# Patient Record
Sex: MALE | Race: WHITE | NOT HISPANIC OR LATINO | Employment: FULL TIME | URBAN - METROPOLITAN AREA
[De-identification: names, ages, dates, MRNs, and addresses within clinical notes are randomized per-mention and may not be internally consistent; named-entity substitution may affect disease eponyms.]

---

## 2017-05-25 ENCOUNTER — TRANSCRIBE ORDERS (OUTPATIENT)
Dept: ADMINISTRATIVE | Facility: HOSPITAL | Age: 52
End: 2017-05-25

## 2017-05-25 ENCOUNTER — APPOINTMENT (OUTPATIENT)
Dept: LAB | Facility: HOSPITAL | Age: 52
End: 2017-05-25
Attending: INTERNAL MEDICINE
Payer: COMMERCIAL

## 2017-05-25 DIAGNOSIS — Z78.9 NORMAL URINALYSIS: ICD-10-CM

## 2017-05-25 DIAGNOSIS — R73.9 BLOOD GLUCOSE ELEVATED: Primary | ICD-10-CM

## 2017-05-25 LAB — VENIPUNCTURE: NORMAL

## 2017-05-25 PROCEDURE — 36415 COLL VENOUS BLD VENIPUNCTURE: CPT | Performed by: INTERNAL MEDICINE

## 2017-10-24 ENCOUNTER — ALLSCRIPTS OFFICE VISIT (OUTPATIENT)
Dept: OTHER | Facility: OTHER | Age: 52
End: 2017-10-24

## 2017-10-24 DIAGNOSIS — R06.09 OTHER FORMS OF DYSPNEA: ICD-10-CM

## 2017-10-26 NOTE — CONSULTS
Assessment  1  Chronic fatigue (780 79) (R53 82)  2  Abnormal circadian rhythm (327 30) (G47 20)  3  Dyspnea on exertion (786 09) (R06 09)    Plan  Abnormal circadian rhythm, Chronic fatigue, Snoring    · Sleep Study Unattended - Home; Status:Need Information - Financial Authorization; Requested ROSARIO:[id number];   Perform:Other; TDV:[id number]; Last Updated By:Shaw Currie; 10/24/2017   4:10:53 PM;Ordered; For:Abnormal circadian rhythm, Chronic fatigue, Snoring;   Ordered By:Ham Correia;  Chest pain    · Start: Aspirin 81 MG Oral Tablet Chewable; 2 tablet daily with food  Rx By: Erinn Conteh; Dispense: 30 Days ; #:30 Tablet Chewable; Refill: 6;For: Chest pain;   HAYLIE = N; Verified Transmission to [facility] (; Last Updated By:   System, SureScripts; 10/24/2017 3:55:20 PM   · Start: Nitroglycerin 0 4 MG Sublingual Tablet Sublingual; PLACE 1 TABLET UNDER THE  TONGUE EVERY 5 MINUTES UP TO 3 DOSES AS NEEDED FOR CHEST PAIN  Rx By: Erinn Conteh; Dispense: 30 Days ; #:30 Tablet Sublingual; Refill: 1;For: Chest   pain; HAYLIE = N; Verified Transmission to [facility] [phone]; Last   Updated By: System, SureScripts; 10/24/2017 4:00:41 PM   · EKG/ECG- POC; Status:Complete;   Done: [id number]  Perform: In Office; [phone]; Last Updated By:Nell Gerardo; 10/24/2017   3:21:24 PM;Ordered; For:Chest pain; Ordered By:Ham Correia;  Dyspnea on exertion    · ECHO COMPLETE WITH CONTRAST IF INDICATED; Status:Need Information - Financial  Authorization; Requested QPO:[id number];   Perform:[facility]; WLD:[id number]; Last Updated By:Shaw Currie;   10/24/2017 4:11:10 PM;Ordered; For:Dyspnea on exertion; Ordered By:Ham Correia;   · STRESS TEST ONLY, EXERCISE; Status:Active; Requested DRY:[id number];   Perform:[facility]; DWI:71HUR0624; Last Updated By:Shaw Currie;   10/24/2017 4:12:07 PM;Ordered;  For:Dyspnea on exertion; Ordered By:Masood, Ham;    Discussion/Summary    Chest pain- intermediate risk for coronary artery disease  His chest pain symptoms have an atypical and typical component  No resting ST-T wave changesstress testplus statin therapy5 mg dailyp r n instructed to come to the ED if with severe chest pain refractory to nitroglycerin    Dyspnea on exertion/abnormal heart soundsecho 2d    1  Abnormal circadian rhythm-Bloomingrose score of 14sleep study  hypertension-uncontrolledBystolicamlodipinediet   total time of encounter was 60 minutes minutes-- and-- 30 minutes spent on counseling minutes was spent counseling  1 Amended By: Kathi Olivares; Oct 25 2017 1:34 PM EST    Chief Complaint  Daily CP with radiating pain down left arm/neck, and family history of cardiac issues  ylm/ma      History of Present Illness  Cardiology HPI Free Text Note Form [facility]: Appreciate consultation by Dr Helder Ledezma for chest painyo family hx of cad with worsening chest pain- dull, 5/10, few minutes radiates to his necks at times  Last severe episode 4th of October  He has chest pain every day  Reports trigger factor may have been loss of his daughter  His daughter had a myocardial infarction at a very young age  Taking nexium for acid reflex previously  feels burning in his chest at times  No swelling in his legs  Dr Helder Ledezma has check  reports having daytime fatigue  An abnormal circadian rhythm  Witnessed apnea at night  Heavy snoring  Helder Ledezma is his primarynuclear stress test in 1994  surgery      Hxengineersmokingalcoholof cofffeesleep snoringseverely tired  Hxstents/MI late 50s      Review of Systems      Cardiac: chest pain, but-- as noted in HPI  Skin: No complaints of nonhealing sores or skin rash  Genitourinary: No complaints of recurrent urinary tract infections, frequent urination at night, difficult urination, blood in urine, kidney stones, loss of bladder control, no kidney or prostate problems, no erectile dysfunction     Psychological: No complaints of feeling depressed, anxiety, panic attacks, or difficulty concentrating  General: No complaints of trouble sleeping, lack of energy, fatigue, appetite changes, weight changes, fever, frequent infections, or night sweats  Respiratory: No complaints of shortness of breath, cough with sputum, or wheezing  HEENT: No complaints of serious problems, hearing problems, nose problems, throat problems, or snoring  Gastrointestinal: No complaints of liver problems, nausea, vomiting, heartburn, constipation, bloody stools, diarrhea, problems swallowing, adbominal pain, or rectal bleeding  Hematologic: No complaints of bleeding disorders, anemia, blood clots, or excessive brusing  Neurological: No complaints of numbness, tingling, dizziness, weakness, seizures, headaches, syncope or fainting, AM fatigue, daytime sleepiness, no witnessed apnea episodes  Musculoskeletal: No complaints of arthritis, back pain, or painfull swelling  ROS reviewed  Active Problems  1  Chest pain (786 50) (R07 9)    Past Medical History   · History of asthma (V12 69) (Z87 09)   · History of high cholesterol (V12 29) (Z86 39)    Surgical History    The surgical history was reviewed and updated today  Family History  Mother    · Family history of cerebrovascular accident (CVA) (V17 1) (Z82 3)  Family History Reviewed: The family history was reviewed and updated today  Social History  The social history was reviewed and updated today  Current Meds  1  ALPRAZolam 0 5 MG Oral Tablet; TAKE 1 TABLET DAILY; Therapy: (Recorded:24Oct2017) to Recorded  2  AmLODIPine Besylate 5 MG Oral Tablet; TAKE 1 TABLET DAILY; Therapy: (Recorded:24Oct2017) to Recorded  3  Atorvastatin Calcium 20 MG Oral Tablet; TAKE 1 TABLET AT BEDTIME; Therapy: (OJUHDBHU:70QTT6594) to Recorded  4  DULoxetine HCl - 60 MG Oral Capsule Delayed Release Particles; take 1 capsule daily; Therapy: ([place]:55ZPY7235) to Recorded  5   Escitalopram Oxalate 20 MG Oral Tablet; TAKE 1 TABLET DAILY; Therapy: ([id number]) to Recorded  6  QUEtiapine Fumarate 25 MG Oral Tablet; TAKE 1 TABLET AT BEDTIME; Therapy: (Recorded:2017) to Recorded    Allergies  1  Penicillins    Vitals  Signs   Heart Rate: 71, Apical  Systolic: 659, RUE, Sitting  Diastolic: 88, RUE, Sitting  Height: 5 ft 6 75 in  Weight: 197 lb 8 0 oz  BMI Calculated: 31 17  BSA Calculated: 2 01  O2 Saturation: 98, RA    Physical Exam    Constitutional No conversational dyspnea  Eyes   Conjunctiva and Sclera examination: Conjunctiva pink, sclera anicteric  Ears, Nose, Mouth, and Throat - mallpati 3  Neck   Neck and thyroid: Normal, supple, trachea midline, no thyromegaly  Pulmonary   Respiratory effort: No increased work of breathing or signs of respiratory distress  Auscultation of lungs: Clear to auscultation, no rales, no rhonchi, no wheezing, good air movement  Cardiovascular pmi displaced  Carotid pulses: Normal, 2+ bilaterally  Peripheral vascular exam: Normal pulses throughout, no tenderness, erythema or swelling  Pedal pulses: Normal, 2+ bilaterally  Examination of extremities for edema and/or varicosities: Normal     Abdomen   Abdomen: Non-tender and no distention  Liver and spleen: No hepatomegaly or splenomegaly  Musculoskeletal Gait and station: Normal gait  -- Digits and nails: Normal without clubbing or cyanosis  -- Inspection/palpation of joints, bones, and muscles: Normal, ROM normal     Skin - Skin and subcutaneous tissue: Normal without rashes or lesions  Skin is warm and well perfused, normal turgor  Neurologic - Cranial nerves: II - XII intact  -- Speech: Normal     Psychiatric - Orientation to person, place, and time: Normal -- Mood and affect: Normal       Results/Data  I personally reviewed the recording/images in the office today  My interpretation follows     Normal sinus rhythm no acute ST-T wave changes      Future Appointments    Date/Time Provider Specialty Site   12/08/2017 03:20 PM MAGNUS Clinton  Cardiology [facility] CARDIOLOGY [facility]     End of Encounter Meds  1  Aspirin 81 MG Oral Tablet Chewable; 2 tablet daily with food; Therapy: [id number] to (Amelie Wan)  Requested for: [phone]; Last   Rx:24Oct2017 Ordered  2  Nitroglycerin 0 4 MG Sublingual Tablet Sublingual; PLACE 1 TABLET UNDER THE   TONGUE EVERY 5 MINUTES UP TO 3 DOSES AS NEEDED FOR CHEST PAIN;   Therapy: 27ZNZ8953 to (Evaluate:16Fbo9799)  Requested for: 24Oct2017; Last   Rx:24Oct2017 Ordered  3  ALPRAZolam 0 5 MG Oral Tablet; TAKE 1 TABLET DAILY; Therapy: (YKXIJTXN:15IMS1955) to Recorded  4  DULoxetine HCl - 60 MG Oral Capsule Delayed Release Particles; take 1 capsule daily; Therapy: (FQJURAQP:70GYM9479) to Recorded  5  Escitalopram Oxalate 20 MG Oral Tablet; TAKE 1 TABLET DAILY; Therapy: (JBZMQPLF:01PXO8985) to Recorded  6  QUEtiapine Fumarate 25 MG Oral Tablet; TAKE 1 TABLET AT BEDTIME; Therapy: (QFUMHGMN:59NOX0463) to Recorded  7  Atorvastatin Calcium 20 MG Oral Tablet; TAKE 1 TABLET AT BEDTIME; Therapy: (VPQDMXED:49YWA4615) to Recorded  8  AmLODIPine Besylate 5 MG Oral Tablet; TAKE 1 TABLET DAILY;    Therapy: (CEJMPJWO:83DXU7046) to Recorded    Signatures   Electronically signed by : MAGNUS Luis ; Oct 25 2017  1:35PM EST                       (Author)

## 2017-10-27 ENCOUNTER — TRANSCRIBE ORDERS (OUTPATIENT)
Dept: ADMINISTRATIVE | Facility: HOSPITAL | Age: 52
End: 2017-10-27

## 2017-10-27 DIAGNOSIS — R06.89 HYPOVENTILATION, IDIOPATHIC: Primary | ICD-10-CM

## 2017-10-27 DIAGNOSIS — G47.33 OSA (OBSTRUCTIVE SLEEP APNEA): ICD-10-CM

## 2017-11-01 ENCOUNTER — HOSPITAL ENCOUNTER (OUTPATIENT)
Dept: SLEEP CENTER | Facility: CLINIC | Age: 52
Discharge: HOME | End: 2017-11-01
Payer: COMMERCIAL

## 2017-11-01 DIAGNOSIS — G47.33 OSA (OBSTRUCTIVE SLEEP APNEA): ICD-10-CM

## 2017-11-01 PROCEDURE — G0399 HOME SLEEP TEST/TYPE 3 PORTA: HCPCS

## 2017-11-16 ENCOUNTER — HOSPITAL ENCOUNTER (OUTPATIENT)
Dept: NON INVASIVE DIAGNOSTICS | Facility: HOSPITAL | Age: 52
Discharge: HOME | End: 2017-11-16
Attending: INTERNAL MEDICINE
Payer: COMMERCIAL

## 2017-11-16 DIAGNOSIS — R06.09 OTHER FORMS OF DYSPNEA: ICD-10-CM

## 2017-11-16 PROCEDURE — 93306 TTE W/DOPPLER COMPLETE: CPT

## 2017-11-16 PROCEDURE — 93017 CV STRESS TEST TRACING ONLY: CPT

## 2017-11-16 RX ORDER — DULOXETIN HYDROCHLORIDE 60 MG/1
20 CAPSULE, DELAYED RELEASE ORAL DAILY
COMMUNITY

## 2017-11-16 RX ORDER — NEBIVOLOL 5 MG/1
5 TABLET ORAL DAILY
COMMUNITY
End: 2019-02-26

## 2017-11-16 RX ORDER — ATORVASTATIN CALCIUM 20 MG/1
20 TABLET, FILM COATED ORAL DAILY
COMMUNITY

## 2017-11-16 RX ORDER — AMLODIPINE BESYLATE 5 MG/1
5 TABLET ORAL DAILY
COMMUNITY
End: 2019-02-26 | Stop reason: SDUPTHER

## 2017-11-16 RX ORDER — ASPIRIN 81 MG/1
81 TABLET ORAL DAILY
COMMUNITY

## 2017-11-16 RX ORDER — QUETIAPINE FUMARATE 25 MG/1
25 TABLET, FILM COATED ORAL
COMMUNITY

## 2017-11-17 LAB
CHEST PAIN STATEMENT: NORMAL
MAX DIASTOLIC BP: 80 MMHG
MAX HEART RATE: 139 BPM
MAX PREDICTED HEART RATE: 166 BPM
MAX. SYSTOLIC BP: 208 MMHG
PROTOCOL NAME: NORMAL
REASON FOR TERMINATION: NORMAL
TARGET HR FORMULA: NORMAL
TEST INDICATION: NORMAL
TIME IN EXERCISE PHASE: NORMAL

## 2017-12-08 ENCOUNTER — ALLSCRIPTS OFFICE VISIT (OUTPATIENT)
Dept: OTHER | Facility: OTHER | Age: 52
End: 2017-12-08

## 2017-12-11 NOTE — PROGRESS NOTES
Assessment  Assessed    1  Snoring (786 09) (R06 83)   2  Abnormal circadian rhythm (327 30) (G47 20)   3  Malignant essential hypertension (401 0) (I10)   4  Hyperlipidemia (272 4) (E78 5)   5  Chest pain (786 50) (R07 9)   6  Chronic fatigue (780 79) (R53 82)   7  Dyspnea on exertion (786 09) (R06 09)   8  Encounter for preventive health examination (V70 0) (Z00 00)   9  Hypertension, unspecified type (401 9) (I10)    Plan  Abnormal circadian rhythm    · *1 -  SLEEP CENTER Co-Management  Abnormal sleep screen wants to discussabout possible options  Concerned about cpap  Status: Active  Requested for:14Tko7064   Ordered; Abnormal circadian rhythm; Ordered By: Kathi Olivares Performed:  Due: 11BQL1356; Last Updated By: Amber Villasenor; 12/8/2017 4:09:04 PM  Care Summary provided  : Yes  Hypertension, unspecified type    · From  AmLODIPine Besylate 5 MG Oral Tablet TAKE 1 TABLET DAILY ToAmLODIPine Besylate 5 MG Oral Tablet TAKE 1 TABLET Daily in evening   Rx By: Kathi Olivares; Dispense: 90 Days ; #:90 Tablet; Refill: 3;For: Hypertension, unspecified type; HAYLIE = N; Record  Hypertension, unspecified type, Malignant essential hypertension    · (1) BASIC METABOLIC PROFILE; Status:Active; Requested XBN:[id number]; Perform:[facility]; Due:17Ymx4918;Ordered;For:Hypertension, unspecified type, Malignant essential hypertension; Ordered By:Ham Correia;  Malignant essential hypertension    · Bystolic 10 MG Oral Tablet; TAKE 1 TABLET DAILY   Rx By: Kathi Olivares; Dispense: 90 Days ; #:90 Tablet; Refill: 3;Malignant essential hypertension; HAYLIE = N; Verified Transmission to [address] [place] [address]; Last Updated By: System, SureScripts; 12/8/2017 4:01:03 PM   · HydroCHLOROthiazide 12 5 MG Oral Tablet; TAKE 1 TABLET DAILY IN THEMORNING   Rx By: Kathi Olivares; Dispense: 30 Days ; #:30 Tablet;  Refill: 3;Malignant essential hypertension; HAYLIE = N; Verified Transmission to [address] [place] [address]; Last Updated By: System, [place]; 12/8/2017 4:01:05 PM    Discussion/Summary  Cardiology Discussion Summary Free Text Note Form [facility]:   Chest pain- intermediate risk for coronary artery disease  His chest pain symptoms have an atypical and typical component  No resting ST-T wave changesplus statin updwrki26 mg dailyhxtz 12 5 mg in AM    on exertion/abnormal heart sounds- BP control  circadian rhythm-Lenora score of 14  abnormal [facility]with [facility]  hypertension-uncontrolledBystolicamlodipinediet  Counseling Documentation With Imm: total time of encounter was 60 minutes minutes-- and-- 30 minutes spent on counseling minutes was spent counseling  Chief Complaint  Chief Complaint Free Text Note Form: Gardenia Kawasaki is here today as a followup from recent testing  He states that he has been feeling pretty good  JW      History of Present Illness  Cardiology HPI Free Text Note Form [facility]: Appreciate consultation by Dr Estella Balderrama for chest painyo family hx of cad with worsening chest pain- dull, 5/10, few minutes radiates to his necks at times  Last severe episode 4th of October  He has chest pain every day  Reports trigger factor may have been loss of his daughter  His daughter had a myocardial infarction at a very young age  Taking nexium for acid reflex previously  feels burning in his chest at times  No swelling in his legs  Dr Estella Balderrama has check  reports having daytime fatigue  An abnormal circadian rhythm  Witnessed apnea at night  Heavy snoring  We reviewed through his recent stress test and sleep study  His wife reports continued poor sleep, snoring, daytime fatigue  His blood pressure has been elevated at times  Estella Balderrama is his primarynuclear stress test in 1994  surgery      Hxengineersmokingalcoholof cofffeesleepsnoringseverely tired  Hxstents/MI late 46s      Review of Systems  Cardiology Male ROS:    Cardiac: as noted in HPI-- and-- no chest pain  Skin: No complaints of nonhealing sores or skin rash  Genitourinary: No complaints of recurrent urinary tract infections, frequent urination at night, difficult urination, blood in urine, kidney stones, loss of bladder control, no kidney or prostate problems, no erectile dysfunction  Psychological: No complaints of feeling depressed, anxiety, panic attacks, or difficulty concentrating  General: No complaints of trouble sleeping, lack of energy, fatigue, appetite changes, weight changes, fever, frequent infections, or night sweats  Respiratory: No complaints of shortness of breath, cough with sputum, or wheezing  HEENT: No complaints of serious problems, hearing problems, nose problems, throat problems, or snoring  Gastrointestinal: No complaints of liver problems, nausea, vomiting, heartburn, constipation, bloody stools, diarrhea, problems swallowing, adbominal pain, or rectal bleeding  Hematologic: No complaints of bleeding disorders, anemia, blood clots, or excessive brusing  Neurological: No complaints of numbness, tingling, dizziness, weakness, seizures, headaches, syncope or fainting, AM fatigue, daytime sleepiness, no witnessed apnea episodes  Musculoskeletal: No complaints of arthritis, back pain, or painfull swelling  ROS Reviewed:   ROS reviewed  Active Problems  Problems    1  Abnormal circadian rhythm (327 30) (G47 20)   2  Chest pain (786 50) (R07 9)   3  Chronic fatigue (780 79) (R53 82)   4  Dyspnea on exertion (786 09) (R06 09)   5  Hyperlipidemia (272 4) (E78 5)   6  Hypertension, unspecified type (401 9) (I10)   7  Snoring (786 09) (R06 83)    Past Medical History  Problems    1  History of asthma (V12 69) (Z87 09)   2  History of high cholesterol (V12 29) (Z86 39)   3  Hypertension, unspecified type (401 9) (I10)    Surgical History  Problems    1  History of Rotator Cuff Repair   2  History of Shoulder Surgery Right  Surgical History Reviewed: The surgical history was reviewed and updated today  Family History  Mother    1  Family history of cerebrovascular accident (CVA) (V17 1) (Z82 3)   2  Family history of coronary artery disease (V17 3) (Z82 49)   3  Family history of diabetes mellitus (V18 0) (Z83 3)   4  Family history of hypertension (V17 49) (Z82 49)   5  Family history of malignant neoplasm (V16 9) (Z80 9)   6  Family history of High cholesterol  Father    7  Family history of diabetes mellitus (V18 0) (Z83 3)   8  Family history of hypertension (V17 49) (Z82 49)   9  Family history of High cholesterol  Daughter    8  Family history of sudden cardiac death (SCD) (V17 41) (Z82 41)  Family History Reviewed: The family history was reviewed and updated today  Social History  Problems    · Lack of exercise (V69 0) (Z72 3)   ·    · Never a smoker   · Occasional alcohol use   · Sleeps 6 -7 hours a day  Social History Reviewed: The social history was reviewed and updated today  Current Meds   1  ALPRAZolam 0 5 MG Oral Tablet; TAKE 1 TABLET DAILY; Therapy: (Recorded:24Oct2017) to Recorded   2  AmLODIPine Besylate 5 MG Oral Tablet; TAKE 1 TABLET DAILY; Therapy: (Recorded:24Oct2017) to Recorded   3  Aspirin 81 MG Oral Tablet Chewable; 2 tablet daily with food; Therapy: 50GTS6862 to (Priti Simeon)  Requested for: 24Oct2017; Last Rx:24Oct2017 Ordered   4  Atorvastatin Calcium 20 MG Oral Tablet; TAKE 1 TABLET AT BEDTIME; Therapy: (OOYXFXMV:53AKT2467) to Recorded   5  DULoxetine HCl - 60 MG Oral Capsule Delayed Release Particles; take 1 capsule daily; Therapy: (QPKTFBMI:48MPH8602) to Recorded   6  Escitalopram Oxalate 20 MG Oral Tablet; TAKE 1 TABLET DAILY; Therapy: (MLQRCLFQ:86JMN8263) to Recorded   7  Nitroglycerin 0 4 MG Sublingual Tablet Sublingual; PLACE 1 TABLET UNDER THE TONGUE EVERY 5 MINUTES UP TO 3 DOSES AS NEEDED FOR CHEST PAIN; Therapy: 35TRN9457 to (Evaluate:81Ccf2630)  Requested for: 24Oct2017; Last Rx:24Oct2017 Ordered   8  QUEtiapine Fumarate 25 MG Oral Tablet; TAKE 1 TABLET AT BEDTIME;  Therapy: Cardiology  [facility]       Signatures   Electronically signed by : MAGNUS Lauren ; Dec 10 2017  3:20PM EST                       (Author)

## 2017-12-18 ENCOUNTER — GENERIC CONVERSION - ENCOUNTER (OUTPATIENT)
Dept: OTHER | Facility: OTHER | Age: 52
End: 2017-12-18

## 2018-01-03 DIAGNOSIS — I10 ESSENTIAL (PRIMARY) HYPERTENSION: ICD-10-CM

## 2018-01-13 VITALS
SYSTOLIC BLOOD PRESSURE: 150 MMHG | WEIGHT: 197.5 LBS | HEART RATE: 71 BPM | HEIGHT: 67 IN | BODY MASS INDEX: 31 KG/M2 | DIASTOLIC BLOOD PRESSURE: 88 MMHG | OXYGEN SATURATION: 98 %

## 2018-01-22 VITALS
HEIGHT: 67 IN | HEART RATE: 68 BPM | BODY MASS INDEX: 30.7 KG/M2 | SYSTOLIC BLOOD PRESSURE: 140 MMHG | WEIGHT: 195.6 LBS | OXYGEN SATURATION: 98 % | DIASTOLIC BLOOD PRESSURE: 80 MMHG

## 2018-01-23 NOTE — MISCELLANEOUS
Message  I called the patient to make an appointment with the sleep specialist  I left a message for him to call the office to do so  Active Problems    1  Abnormal circadian rhythm (327 30) (G47 20)   2  Chest pain (786 50) (R07 9)   3  Chronic fatigue (780 79) (R53 82)   4  Dyspnea on exertion (786 09) (R06 09)   5  Hyperlipidemia (272 4) (E78 5)   6  Hypertension, unspecified type (401 9) (I10)   7  Malignant essential hypertension (401 0) (I10)   8  Snoring (786 09) (R06 83)    Current Meds   1  ALPRAZolam 0 5 MG Oral Tablet; TAKE 1 TABLET DAILY; Therapy: (Recorded:24Oct2017) to Recorded   2  AmLODIPine Besylate 5 MG Oral Tablet; TAKE 1 TABLET Daily in evening; Last   Rx:50Ijn3998 Ordered   3  Aspirin 81 MG Oral Tablet Chewable; 2 tablet daily with food; Therapy: 42KRX5171 to (Adalberto Daly)  Requested for: 24Oct2017; Last   Rx:24Oct2017 Ordered   4  Atorvastatin Calcium 20 MG Oral Tablet; TAKE 1 TABLET AT BEDTIME; Therapy: (UGJJMYBT:59DTF7239) to Recorded   5  Bystolic 10 MG Oral Tablet; TAKE 1 TABLET DAILY; Therapy: 93FRE3694 to (Evaluate:27Qox3809)  Requested for: 86DPD7033; Last   Rx:81Myw9309 Ordered   6  DULoxetine HCl - 60 MG Oral Capsule Delayed Release Particles; take 1 capsule daily; Therapy: (FUYPNZKZ:00MAZ3332) to Recorded   7  Escitalopram Oxalate 20 MG Oral Tablet; TAKE 1 TABLET DAILY; Therapy: (JWGDCUFV:86WNV5086) to Recorded   8  HydroCHLOROthiazide 12 5 MG Oral Tablet; TAKE 1 TABLET DAILY IN THE MORNING; Therapy: 00RBD3931 to (Evaluate:07Apr2018)  Requested for: 05JVD2033; Last   Rx:09Uyn0714 Ordered   9  Nitroglycerin 0 4 MG Sublingual Tablet Sublingual; PLACE 1 TABLET UNDER THE   TONGUE EVERY 5 MINUTES UP TO 3 DOSES AS NEEDED FOR CHEST PAIN;   Therapy: 19LPB8384 to (Evaluate:36Fid6024)  Requested for: 24Oct2017; Last   Rx:24Oct2017 Ordered   10  QUEtiapine Fumarate 25 MG Oral Tablet; TAKE 1 TABLET AT BEDTIME;     Therapy: (Recorded:24Oct2017) to Recorded    Allergies 1  Penicillins    Signatures   Electronically signed by :  Jackie Miller, ; Dec 18 2017  3:53PM EST                       (Author)

## 2018-02-07 ENCOUNTER — OFFICE VISIT (OUTPATIENT)
Dept: PULMONOLOGY | Facility: MEDICAL CENTER | Age: 53
End: 2018-02-07
Payer: COMMERCIAL

## 2018-02-07 VITALS
DIASTOLIC BLOOD PRESSURE: 76 MMHG | SYSTOLIC BLOOD PRESSURE: 118 MMHG | RESPIRATION RATE: 18 BRPM | HEART RATE: 58 BPM | OXYGEN SATURATION: 97 % | TEMPERATURE: 97.8 F | BODY MASS INDEX: 31.23 KG/M2 | WEIGHT: 199 LBS | HEIGHT: 67 IN

## 2018-02-07 DIAGNOSIS — G47.33 OBSTRUCTIVE SLEEP APNEA: ICD-10-CM

## 2018-02-07 DIAGNOSIS — I10 ESSENTIAL HYPERTENSION: Primary | ICD-10-CM

## 2018-02-07 PROCEDURE — 99244 OFF/OP CNSLTJ NEW/EST MOD 40: CPT | Performed by: NURSE PRACTITIONER

## 2018-02-07 RX ORDER — HYDROCHLOROTHIAZIDE 12.5 MG/1
1 TABLET ORAL DAILY
COMMUNITY
Start: 2017-12-08 | End: 2019-02-26

## 2018-02-07 RX ORDER — MELOXICAM 15 MG/1
15 TABLET ORAL DAILY
Refills: 0 | COMMUNITY
Start: 2018-01-29

## 2018-02-07 RX ORDER — NEBIVOLOL 10 MG/1
1 TABLET ORAL DAILY
COMMUNITY
Start: 2017-12-08 | End: 2018-10-19 | Stop reason: SDUPTHER

## 2018-02-07 RX ORDER — ESCITALOPRAM OXALATE 20 MG/1
1 TABLET ORAL DAILY
COMMUNITY

## 2018-02-07 RX ORDER — ALPRAZOLAM 0.5 MG/1
1 TABLET ORAL DAILY
COMMUNITY

## 2018-02-07 RX ORDER — NITROGLYCERIN 0.4 MG/1
1 TABLET SUBLINGUAL
COMMUNITY
Start: 2017-10-24

## 2018-02-07 NOTE — ASSESSMENT & PLAN NOTE
Elli Mcelroy has a history of malignant essential hypertension  He follows with Dr Rosalynn Apley  He had chest pain and underwent stress test   He currently is on Bystolic, HCTZ, Sharmaine Rao lobe [facility] with controlled high blood pressure  He did have 2D echocardiogram November 16, 2017  Ejection fraction was 55%  Radiate REM was mildly dilated there was mild annular calcification in the mitral valve mild regurgitation of tricuspid valve with PA pressure 25 mm Hg  He had normal left ventricular diastolic dysfunction  He is compliant with his medication and noted that blood pressure today was 118/76  There is a familial history of coronary artery disease  Patient on is a nonsmoker  His daughter recently passed away with myocardial infarction

## 2018-02-07 NOTE — PROGRESS NOTES
Assessment/Plan:       Problem List Items Addressed This Visit        Respiratory    Obstructive sleep apnea     Jose Peña is a 47-year-old male who has history of dyspnea on exertion as well as malignant hypertension  He was referred by his cardiologist   Apparently this patient snores heavily and had a sleep study that was done on November 1, 2007 and November 2, 2017  It was interpreted by Dr marie he has mild sleep apnea that becomes moderately severe during supine sleep apneic hypopnea index was 13 7 events per hour he also had event related hypoxemia  I did review anatomy the upper airway, diagnosis and treatment of obstructive sleep apnea  Patient and his wife have agreed to get auto CPAP  They are aware that compliance visit will be 4-6 weeks approximately after delivery of the machine  All questions were answered  Cardiovascular and Mediastinum    Hypertension - Primary     Willis Santacruz has a history of malignant essential hypertension  He follows with Dr Darren Packer  He had chest pain and underwent stress test   He currently is on Bystolic, HCTZ, Gry[place]a Lowing lobe [place] with controlled high blood pressure  He did have 2D echocardiogram November 16, 2017  Ejection fraction was 55%  Radiate REM was mildly dilated there was mild annular calcification in the mitral valve mild regurgitation of tricuspid valve with PA pressure 25 mm Hg  He had normal left ventricular diastolic dysfunction  He is compliant with his medication and noted that blood pressure today was 118/76  There is a familial history of coronary artery disease  Patient on is a nonsmoker  His daughter recently passed away with myocardial infarction  Relevant Medications    nebivolol (BYSTOLIC) 10 mg tablet    hydrochlorothiazide (HYDRODIURIL) 12 5 mg tablet            No Follow-up on file  All questions are answered to the patient's satisfaction and understanding  He verbalizes understanding    He is encouraged to call with any further questions or concerns  Portions of the record may have been created with voice recognition software  Occasional wrong word or "sound a like" substitutions may have occurred due to the inherent limitations of voice recognition software  Read the chart carefully and recognize, using context, where substitutions have occurred  ______________________________________________________________________    Chief Complaint:   Chief Complaint   Patient presents with    Sleeping Problem     sleep consult per Kelly Atkinson        Patient ID: Margarita Negrete is a 45 y o  y o  male has a past medical history of Asthma and High cholesterol  2/7/2018  Margarita Negrete is here today for review if home sleep study  He was referred by his cardiologist   Home sleep study revealed mild to moderate obstructive sleep apnea  Apneic hypopnea index was 13 7 events per hour  He also had event related hypoxemia with lowest oxygen recorded 81%  According to patient's wife he does snore heavily  He reports daytime hypersomnolence  Also has history of malignant hypertension which is currently controlled  Occupational/Exposure history:  Travel history:  Review of Systems   Constitutional: Negative  HENT: Negative  Eyes: Negative  Respiratory: Negative  Cardiovascular: Negative  Gastrointestinal: Negative  Endocrine: Negative  Genitourinary: Negative  Musculoskeletal: Negative  Allergic/Immunologic: Negative  Neurological: Negative  Hematological: Negative  Psychiatric/Behavioral: Negative  Social history: He reports that he has never smoked  He has never used smokeless tobacco  He reports that he drinks alcohol  He reports that he does not use drugs      Past surgical history:   Past Surgical History:   Procedure Laterality Date    ROTATOR CUFF REPAIR  10/24/2017    SHOULDER SURGERY Right      Family history:   Family History   Problem Relation Age of Onset    Stroke Mother     Coronary artery disease Mother     Diabetes Mother     Hypertension Mother     Hyperlipidemia Mother     Diabetes Father     Hypertension Father     Hyperlipidemia Father     Heart attack Daughter          There is no immunization history on file for this patient  Current Outpatient Prescriptions   Medication Sig Dispense Refill    ALPRAZolam (XANAX) 0 5 mg tablet Take 1 tablet by mouth daily      amLODIPine (NORVASC) 5 mg tablet Take 5 mg by mouth daily      aspirin (ECOTRIN LOW STRENGTH) 81 mg EC tablet Take 81 mg by mouth daily      atorvastatin (LIPITOR) 20 mg tablet Take 20 mg by mouth daily      DULoxetine (CYMBALTA) 60 mg delayed release capsule Take 20 mg by mouth daily      escitalopram (LEXAPRO) 20 mg tablet Take 1 tablet by mouth daily      hydrochlorothiazide (HYDRODIURIL) 12 5 mg tablet Take 1 tablet by mouth Daily      nebivolol (BYSTOLIC) 10 mg tablet Take 1 tablet by mouth daily      nitroglycerin (NITROSTAT) 0 4 mg SL tablet Place 1 tablet under the tongue every 5 (five) minutes as needed      QUEtiapine (SEROquel) 25 mg tablet Take 25 mg by mouth daily at bedtime      meloxicam (MOBIC) 15 mg tablet Take 15 mg by mouth daily  0    nebivolol (BYSTOLIC) 5 mg tablet Take 5 mg by mouth daily       No current facility-administered medications for this visit  Allergies: Penicillins    Objective:  Vitals:    02/07/18 1400   BP: 118/76   BP Location: Left arm   Patient Position: Sitting   Cuff Size: Adult   Pulse: 58   Resp: 18   Temp: 97 8 °F (36 6 °C)   TempSrc: Oral   SpO2: 97%   Weight: 90 3 kg (199 lb)   Height: 5' 7" (1 702 m)   Oxygen Therapy  SpO2: 97 %    Wt Readings from Last 3 Encounters:   02/07/18 90 3 kg (199 lb)   12/08/17 88 7 kg (195 lb 9 6 oz)   10/24/17 89 6 kg (197 lb 8 oz)     Body mass index is 31 17 kg/m²  Physical Exam   Constitutional: He is oriented to person, place, and time  He appears well-developed and well-nourished  HENT:   Head: Normocephalic and atraumatic  Mallampati 4   Eyes: Conjunctivae and EOM are normal  Pupils are equal, round, and reactive to light  Neck: Normal range of motion  No tracheal deviation present  No thyromegaly present  Cardiovascular: Normal rate and regular rhythm  Pulmonary/Chest: Effort normal    Abdominal: Soft  Bowel sounds are normal    Musculoskeletal: Normal range of motion  Neurological: He is alert and oriented to person, place, and time  Skin: Skin is warm and dry  Psychiatric: He has a normal mood and affect  His behavior is normal  Thought content normal        Lab Review:   Hospital Outpatient Visit on 11/16/2017   Component Date Value    Protocol Name 11/16/2017 Francheska Fuentes Time In Exercise Phase 11/16/2017 00:09:06     MAX  SYSTOLIC BP 02/79/1248 584     Max Diastolic Bp 52/50/9312 80     Max Heart Rate 11/16/2017 139     Max Predicted Heart Rate 11/16/2017 166     Reason for Termination 11/16/2017 LEG PAIN     Test Indication 11/16/2017 Dyspnea, CHEST PAIN     Target Hr Formular 11/16/2017 (220 - Age)*85%     Chest Pain Statement 11/16/2017 none        Diagnostics:  I have personally reviewed pertinent reports  Office Spirometry Results:     No results found

## 2018-02-07 NOTE — ASSESSMENT & PLAN NOTE
Meron Vasquez is a 57-year-old male who has history of dyspnea on exertion as well as malignant hypertension  He was referred by his cardiologist   Apparently this patient snores heavily and had a sleep study that was done on November 1, 2007 and November 2, 2017  It was interpreted by Dr marie he has mild sleep apnea that becomes moderately severe during supine sleep apneic hypopnea index was 13 7 events per hour he also had event related hypoxemia  I did review anatomy the upper airway, diagnosis and treatment of obstructive sleep apnea  Patient and his wife have agreed to get auto CPAP  They are aware that compliance visit will be 4-6 weeks approximately after delivery of the machine  All questions were answered

## 2018-02-07 NOTE — LETTER
February 7, 2018     Porsche Corona MD  [address] [facility] [address]    Patient: Francois Clark   YOB: 1965   Date of Visit: 2/7/2018       Dear Dr Darrick Goldberg: Thank you for referring Jessa Mansfield to me for evaluation  Below are my notes for this consultation  If you have questions, please do not hesitate to call me  I look forward to following your patient along with you  Sincerely,        JANICE Ortiz        CC: No Recipients  JANICE Ortiz  2/7/2018  2:40 PM  Sign at close encounter  Assessment/Plan:       Problem List Items Addressed This Visit        Respiratory    Obstructive sleep apnea     Yoselin Yost is a 49-year-old male who has history of dyspnea on exertion as well as malignant hypertension  He was referred by his cardiologist   Apparently this patient snores heavily and had a sleep study that was done on November 1, 2007 and November 2, 2017  It was interpreted by Dr marie he has mild sleep apnea that becomes moderately severe during supine sleep apneic hypopnea index was 13 7 events per hour he also had event related hypoxemia  I did review anatomy the upper airway, diagnosis and treatment of obstructive sleep apnea  Patient and his wife have agreed to get auto CPAP  They are aware that compliance visit will be 4-6 weeks approximately after delivery of the machine  All questions were answered  Cardiovascular and Mediastinum    Hypertension - Primary     Maria Fernanda Dress has a history of malignant essential hypertension  He follows with Dr Divya Quinones  He had chest pain and underwent stress test   He currently is on Bystolic, HCTZ, Cori Schanz lobe [place] with controlled high blood pressure  He did have 2D echocardiogram November 16, 2017  Ejection fraction was 55%  Radiate REM was mildly dilated there was mild annular calcification in the mitral valve mild regurgitation of tricuspid valve with PA pressure 25 mm Hg    He had normal left ventricular diastolic dysfunction  He is compliant with his medication and noted that blood pressure today was 118/76  There is a familial history of coronary artery disease  Patient on is a nonsmoker  His daughter recently passed away with myocardial infarction  Relevant Medications    nebivolol (BYSTOLIC) 10 mg tablet    hydrochlorothiazide (HYDRODIURIL) 12 5 mg tablet            No Follow-up on file  All questions are answered to the patient's satisfaction and understanding  He verbalizes understanding  He is encouraged to call with any further questions or concerns  Portions of the record may have been created with voice recognition software  Occasional wrong word or "sound a like" substitutions may have occurred due to the inherent limitations of voice recognition software  Read the chart carefully and recognize, using context, where substitutions have occurred  ______________________________________________________________________    Chief Complaint:   Chief Complaint   Patient presents with    Sleeping Problem     sleep consult per Latoya Giang        Patient ID: Willis Santacruz is a 45 y o  y o  male has a past medical history of Asthma and High cholesterol  2/7/2018  Willis Santacruz is here today for review if home sleep study  He was referred by his cardiologist   Home sleep study revealed mild to moderate obstructive sleep apnea  Apneic hypopnea index was 13 7 events per hour  He also had event related hypoxemia with lowest oxygen recorded 81%  According to patient's wife he does snore heavily  He reports daytime hypersomnolence  Also has history of malignant hypertension which is currently controlled  Occupational/Exposure history:  Travel history:  Review of Systems   Constitutional: Negative  HENT: Negative  Eyes: Negative  Respiratory: Negative  Cardiovascular: Negative  Gastrointestinal: Negative  Endocrine: Negative  Genitourinary: Negative  Musculoskeletal: Negative  Allergic/Immunologic: Negative  Neurological: Negative  Hematological: Negative  Psychiatric/Behavioral: Negative  Social history: He reports that he has never smoked  He has never used smokeless tobacco  He reports that he drinks alcohol  He reports that he does not use drugs  Past surgical history:   Past Surgical History:   Procedure Laterality Date    ROTATOR CUFF REPAIR  10/24/2017    SHOULDER SURGERY Right      Family history:   Family History   Problem Relation Age of Onset    Stroke Mother     Coronary artery disease Mother     Diabetes Mother     Hypertension Mother     Hyperlipidemia Mother     Diabetes Father     Hypertension Father     Hyperlipidemia Father     Heart attack Daughter          There is no immunization history on file for this patient  Current Outpatient Prescriptions   Medication Sig Dispense Refill    ALPRAZolam (XANAX) 0 5 mg tablet Take 1 tablet by mouth daily      amLODIPine (NORVASC) 5 mg tablet Take 5 mg by mouth daily      aspirin (ECOTRIN LOW STRENGTH) 81 mg EC tablet Take 81 mg by mouth daily      atorvastatin (LIPITOR) 20 mg tablet Take 20 mg by mouth daily      DULoxetine (CYMBALTA) 60 mg delayed release capsule Take 20 mg by mouth daily      escitalopram (LEXAPRO) 20 mg tablet Take 1 tablet by mouth daily      hydrochlorothiazide (HYDRODIURIL) 12 5 mg tablet Take 1 tablet by mouth Daily      nebivolol (BYSTOLIC) 10 mg tablet Take 1 tablet by mouth daily      nitroglycerin (NITROSTAT) 0 4 mg SL tablet Place 1 tablet under the tongue every 5 (five) minutes as needed      QUEtiapine (SEROquel) 25 mg tablet Take 25 mg by mouth daily at bedtime      meloxicam (MOBIC) 15 mg tablet Take 15 mg by mouth daily  0    nebivolol (BYSTOLIC) 5 mg tablet Take 5 mg by mouth daily       No current facility-administered medications for this visit        Allergies: Penicillins    Objective:  Vitals:    02/07/18 1400   BP: 118/76   BP Location: Left arm   Patient Position: Sitting   Cuff Size: Adult   Pulse: 58   Resp: 18   Temp: 97 8 °F (36 6 °C)   TempSrc: Oral   SpO2: 97%   Weight: 90 3 kg (199 lb)   Height: 5' 7" (1 702 m)   Oxygen Therapy  SpO2: 97 %    Wt Readings from Last 3 Encounters:   02/07/18 90 3 kg (199 lb)   12/08/17 88 7 kg (195 lb 9 6 oz)   10/24/17 89 6 kg (197 lb 8 oz)     Body mass index is 31 17 kg/m²  Physical Exam   Constitutional: He is oriented to person, place, and time  He appears well-developed and well-nourished  HENT:   Head: Normocephalic and atraumatic  Mallampati 4   Eyes: Conjunctivae and EOM are normal  Pupils are equal, round, and reactive to light  Neck: Normal range of motion  No tracheal deviation present  No thyromegaly present  Cardiovascular: Normal rate and regular rhythm  Pulmonary/Chest: Effort normal    Abdominal: Soft  Bowel sounds are normal    Musculoskeletal: Normal range of motion  Neurological: He is alert and oriented to person, place, and time  Skin: Skin is warm and dry  Psychiatric: He has a normal mood and affect  His behavior is normal  Thought content normal        Lab Review:   Hospital Outpatient Visit on 11/16/2017   Component Date Value    Protocol Name 11/16/2017 Shannon Josephjoliesaul Time In Exercise Phase 11/16/2017 00:09:06     MAX  SYSTOLIC BP 95/95/1665 896     Max Diastolic Bp 80/74/3977 80     Max Heart Rate 11/16/2017 139     Max Predicted Heart Rate 11/16/2017 166     Reason for Termination 11/16/2017 LEG PAIN     Test Indication 11/16/2017 Dyspnea, CHEST PAIN     Target Hr Formular 11/16/2017 (220 - Age)*85%     Chest Pain Statement 11/16/2017 none        Diagnostics:  I have personally reviewed pertinent reports  Office Spirometry Results:     No results found

## 2018-02-07 NOTE — PATIENT INSTRUCTIONS
Sleep Apnea   AMBULATORY CARE:   Sleep apnea  is also called obstructive sleep apnea  It is a condition that causes you to stop breathing for 10 seconds or more while you are sleeping  During normal sleep, your throat is kept open by muscles that let air pass through easily  Sleep apnea causes the muscles and tissues around your throat to relax and block air from passing through  Sleep apnea may happen many times while you are asleep  Common symptoms include the following:   · No signs of breathing for 10 seconds or more while you sleep    · Snoring loudly, snorting, gasping or choking while you sleep, and waking up suddenly because of these    · A hard time thinking, remembering things, or focusing on your tasks the following day    · Headache or nausea    · Bedwetting or waking up often during the night to urinate    · Feeling sleepy, slow, and tired during the day  Seek care immediately if:   · You have chest pain or trouble breathing  Contact your healthcare provider if:   · You feel tired or depressed  · You have trouble staying awake during the day  · You have trouble thinking clearly  · You have questions or concerns about your condition or care  Treatment for sleep apnea  includes using a continuous positive airway pressure (CPAP) machine to keep your airway open during sleep  A mask is placed over your nose and mouth, or just your nose  The mask is hooked to the CPAP machine that blows a gentle stream of air into the mask when you breathe  This helps keep your airway open so you can breathe more regularly  Extra oxygen may be given to you through the machine  You may be given a mouth device  It looks like a mouth guard or dental retainer and stops your tongue and mouth tissues from blocking your throat while you sleep  Surgery may be needed to remove extra tissues that block your mouth, throat, or nose  Manage sleep apnea:   · Do not smoke    Nicotine and other chemicals in cigarettes and cigars can cause lung damage  Ask your healthcare provider for information if you currently smoke and need help to quit  E-cigarettes or smokeless tobacco still contain nicotine  Talk to your healthcare provider before you use these products  · Do not drink alcohol or take sedative medicine before you go to sleep  Alcohol and sedatives can relax the muscles and tissues around your throat  This can block the airflow to your lungs  · Maintain a healthy weight  Excess tissue around your throat may restrict your breathing  Ask your healthcare provider for information if you need to lose weight  · Sleep on your side or use pillows designed to prevent sleep apnea  This prevents your tongue or other tissues from blocking your throat  You can also raise the head of your bed  Follow up with your healthcare provider as directed:  Write down your questions so you remember to ask them during your visits  © 2017 [address] Information is for End User's use only and may not be sold, redistributed or otherwise used for commercial purposes  All illustrations and images included in CareNotes® are the copyrighted property of [employer]  or [employer]  The above information is an  only  It is not intended as medical advice for individual conditions or treatments  Talk to your doctor, nurse or pharmacist before following any medical regimen to see if it is safe and effective for you

## 2018-04-25 ENCOUNTER — TELEPHONE (OUTPATIENT)
Dept: PULMONOLOGY | Facility: MEDICAL CENTER | Age: 53
End: 2018-04-25

## 2018-04-25 NOTE — TELEPHONE ENCOUNTER
Estella from [facility] called to let us know that the patient has refused the cpap machine twice now,   She let h im know to call if he changes his mind

## 2018-10-19 DIAGNOSIS — I10 HTN (HYPERTENSION), BENIGN: Primary | ICD-10-CM

## 2018-10-19 RX ORDER — NEBIVOLOL 10 MG/1
10 TABLET ORAL DAILY
Qty: 90 TABLET | Refills: 3 | Status: SHIPPED | OUTPATIENT
Start: 2018-10-19 | End: 2019-02-08 | Stop reason: SDUPTHER

## 2019-02-08 DIAGNOSIS — I10 HTN (HYPERTENSION), BENIGN: ICD-10-CM

## 2019-02-10 RX ORDER — NEBIVOLOL 10 MG/1
10 TABLET ORAL DAILY
Qty: 90 TABLET | Refills: 3 | Status: SHIPPED | OUTPATIENT
Start: 2019-02-10 | End: 2019-02-13 | Stop reason: SDUPTHER

## 2019-02-13 DIAGNOSIS — I10 HTN (HYPERTENSION), BENIGN: ICD-10-CM

## 2019-02-14 RX ORDER — NEBIVOLOL 10 MG/1
10 TABLET ORAL DAILY
Qty: 90 TABLET | Refills: 3 | Status: SHIPPED | OUTPATIENT
Start: 2019-02-14 | End: 2019-02-26 | Stop reason: SDUPTHER

## 2019-02-26 ENCOUNTER — OFFICE VISIT (OUTPATIENT)
Dept: CARDIOLOGY CLINIC | Facility: CLINIC | Age: 54
End: 2019-02-26
Payer: COMMERCIAL

## 2019-02-26 VITALS
SYSTOLIC BLOOD PRESSURE: 130 MMHG | OXYGEN SATURATION: 97 % | DIASTOLIC BLOOD PRESSURE: 92 MMHG | HEART RATE: 51 BPM | WEIGHT: 195 LBS | HEIGHT: 66 IN | BODY MASS INDEX: 31.34 KG/M2

## 2019-02-26 DIAGNOSIS — I10 HTN (HYPERTENSION), BENIGN: Primary | ICD-10-CM

## 2019-02-26 DIAGNOSIS — R07.9 CHEST PAIN IN ADULT: ICD-10-CM

## 2019-02-26 PROCEDURE — 99214 OFFICE O/P EST MOD 30 MIN: CPT | Performed by: INTERNAL MEDICINE

## 2019-02-26 PROCEDURE — 93000 ELECTROCARDIOGRAM COMPLETE: CPT | Performed by: INTERNAL MEDICINE

## 2019-02-26 RX ORDER — AMLODIPINE BESYLATE 5 MG/1
5 TABLET ORAL
Qty: 90 TABLET | Refills: 3 | Status: SHIPPED | OUTPATIENT
Start: 2019-02-26 | End: 2020-01-07 | Stop reason: SDUPTHER

## 2019-02-26 RX ORDER — NEBIVOLOL 10 MG/1
10 TABLET ORAL DAILY
Qty: 90 TABLET | Refills: 3 | Status: SHIPPED | OUTPATIENT
Start: 2019-02-26

## 2019-02-26 NOTE — PROGRESS NOTES
Cardiology Follow Up  Suellen Kelly  1965  [id number]  [address]  [address] [facility]  [phone]    1  HTN (hypertension), benign  POCT ECG      Discussion/Plan:  Prior abnormal stress test/atypical cp  -- exercise treadmill stress test    Malignant htn  -- bystolic + hctz    Hld  -- atorvastatin 20mg daily    SILVA  - declines cpap    Rtc- one year    Interval History:  He reports having significant stressors unfortunate events  His blood pressure has been controlled  He is compliant with his medications  He denies having lower extremity edema  He was not able to tolerate CPAP at night for obstructive sleep apnea  His last stress test was suboptimal   States he has improve his conditioning since previously      Patient Active Problem List   Diagnosis    Obstructive sleep apnea    Hypertension     Past Medical History:   Diagnosis Date    Asthma     High cholesterol      Social History     Socioeconomic History    Marital status: /Civil Union     Spouse name: Not on file    Number of children: Not on file    Years of education: Not on file    Highest education level: Not on file   Occupational History    Not on file   Social Needs    Financial resource strain: Not on file    Food insecurity:     Worry: Not on file     Inability: Not on file    Transportation needs:     Medical: Not on file     Non-medical: Not on file   Tobacco Use    Smoking status: Never Smoker    Smokeless tobacco: Never Used   Substance and Sexual Activity    Alcohol use: Yes     Comment: Occasional    Drug use: No    Sexual activity: Not on file   Lifestyle    Physical activity:     Days per week: Not on file     Minutes per session: Not on file    Stress: Not on file   Relationships    Social connections:     Talks on phone: Not on file     Gets together: Not on file     Attends [employer] service: Not on file     Active member of club or organization: Not on file     Attends meetings of clubs or organizations: Not on file     Relationship status: Not on file    Intimate partner violence:     Fear of current or ex partner: Not on file     Emotionally abused: Not on file     Physically abused: Not on file     Forced sexual activity: Not on file   Other Topics Concern    Not on file   Social History Narrative    Not on file      Family History   Problem Relation Age of Onset    Stroke Mother     Coronary artery disease Mother     Diabetes Mother     Hypertension Mother     Hyperlipidemia Mother     Diabetes Father     Hypertension Father     Hyperlipidemia Father     Heart attack Daughter      Past Surgical History:   Procedure Laterality Date    ROTATOR CUFF REPAIR  10/24/2017    SHOULDER SURGERY Right        Current Outpatient Medications:     ALPRAZolam (XANAX) 0 5 mg tablet, Take 1 tablet by mouth daily, Disp: , Rfl:     amLODIPine (NORVASC) 5 mg tablet, Take 5 mg by mouth daily, Disp: , Rfl:     atorvastatin (LIPITOR) 20 mg tablet, Take 20 mg by mouth daily, Disp: , Rfl:     DULoxetine (CYMBALTA) 60 mg delayed release capsule, Take 20 mg by mouth daily, Disp: , Rfl:     escitalopram (LEXAPRO) 20 mg tablet, Take 1 tablet by mouth daily, Disp: , Rfl:     nebivolol (BYSTOLIC) 10 mg tablet, Take 1 tablet (10 mg total) by mouth daily, Disp: 90 tablet, Rfl: 3    nitroglycerin (NITROSTAT) 0 4 mg SL tablet, Place 1 tablet under the tongue every 5 (five) minutes as needed, Disp: , Rfl:     QUEtiapine (SEROquel) 25 mg tablet, Take 25 mg by mouth daily at bedtime, Disp: , Rfl:     aspirin (ECOTRIN LOW STRENGTH) 81 mg EC tablet, Take 81 mg by mouth daily, Disp: , Rfl:     hydrochlorothiazide (HYDRODIURIL) 12 5 mg tablet, Take 1 tablet by mouth Daily, Disp: , Rfl:     meloxicam (MOBIC) 15 mg tablet, Take 15 mg by mouth daily, Disp: , Rfl: 0  Allergies   Allergen Reactions    Penicillins Review of Systems:  Review of Systems   Constitutional: Negative  HENT: Negative  Eyes: Negative  Respiratory: Negative  Cardiovascular: Positive for chest pain  Gastrointestinal: Negative  Endocrine: Negative  Genitourinary: Negative  Musculoskeletal: Negative  Skin: Negative  Allergic/Immunologic: Negative  Neurological: Negative  Hematological: Negative  Psychiatric/Behavioral: Negative  Vitals:    02/26/19 1610   BP: 130/92   BP Location: Right arm   Patient Position: Sitting   Cuff Size: Large   Pulse: (!) 51   SpO2: 97%   Weight: 88 5 kg (195 lb)   Height: 5' 6" (1 676 m)     Physical Exam:  Physical Exam   Constitutional: He is oriented to person, place, and time  He appears well-developed and well-nourished  No distress  HENT:   Head: Normocephalic and atraumatic  Right Ear: External ear normal    Left Ear: External ear normal    Eyes: Pupils are equal, round, and reactive to light  Conjunctivae are normal  Right eye exhibits no discharge  Left eye exhibits no discharge  No scleral icterus  Neck: Normal range of motion  Neck supple  No JVD present  No tracheal deviation present  No thyromegaly present  Cardiovascular: Normal rate and regular rhythm  Exam reveals no friction rub  No murmur heard  Pulmonary/Chest: Effort normal and breath sounds normal  No stridor  No respiratory distress  He has no wheezes  He has no rales  He exhibits no tenderness  Abdominal: Soft  Bowel sounds are normal  He exhibits no distension and no mass  There is no tenderness  There is no rebound and no guarding  Musculoskeletal: Normal range of motion  He exhibits no edema, tenderness or deformity  Neurological: He is alert and oriented to person, place, and time  He has normal reflexes  He displays normal reflexes  No cranial nerve deficit  He exhibits normal muscle tone  Coordination normal    Skin: Skin is warm and dry  No rash noted  He is not diaphoretic   No erythema  No pallor  Psychiatric: He has a normal mood and affect  His behavior is normal  Judgment and thought content normal        Labs:   Obtain prior results    Imaging & Testing   I have personally reviewed pertinent reports  EKG: Personally reviewed  Sinus bradycardia no acute st/t wave changes    Cardiac testing:   Results for orders placed during the hospital encounter of 17   Echo complete with contrast if indicated    Narrative [address]  [address] [facility]  [address]  [phone]    Transthoracic Echocardiogram  2D, M-mode, Doppler, and Color Doppler    Study date:  2017    Patient: Georgia Crawford  MR number: [id number]  Account number: [de-identified]  : 1965  Age: 45 years  Gender: Male  Status: Routine  Location: Echo lab  Height: 67 in  Weight: 196 7 lb  BP: 150/ 88 mmHg    Indications: Hypoventilation, idiopathic    Diagnoses: R07 9 - Chest pain, unspecified    Sonographer:  LUIS FERNANDO Powell  Primary Physician:  Jasmine Marr MD  Referring Physician:  Yara Friedman MD  Group:  Lina Latham  Interpreting Physician:  Saskia Martin MD    SUMMARY    LEFT VENTRICLE:  Systolic function was normal  Ejection fraction was estimated in the range of 55 % to 60 % to be 55 %  Although no diagnostic regional wall motion abnormality was identified, this possibility cannot be completely excluded on the basis of this study  Wall thickness was mildly increased  There was mild concentric hypertrophy  LEFT ATRIUM:  The atrium was mildly dilated  RIGHT ATRIUM:  The atrium was mildly dilated  MITRAL VALVE:  There was mild annular calcification  There was mild regurgitation  TRICUSPID VALVE:  There was mild regurgitation  Estimated peak PA pressure was 25 mmHg  HISTORY: PRIOR HISTORY: Chronic fatigue, Dyspnea, high cholesterol  PROCEDURE: The procedure was performed in the echo lab  This was a routine study   The transthoracic approach was used  The study included complete 2D imaging, M-mode, complete spectral Doppler, and color Doppler  The heart rate was 58 bpm,  at the start of the study  Images were obtained from the parasternal, apical, subcostal, and suprasternal notch acoustic windows  Image quality was adequate  LEFT VENTRICLE: Size was normal  Systolic function was normal  Ejection fraction was estimated in the range of 55 % to 60 % to be 55 %  Although no diagnostic regional wall motion abnormality was identified, this possibility cannot be  completely excluded on the basis of this study  Wall thickness was mildly increased  There was mild concentric hypertrophy  DOPPLER: Left ventricular diastolic function parameters were normal for the patient's age  RIGHT VENTRICLE: The size was normal  Systolic function was normal     LEFT ATRIUM: The atrium was mildly dilated  RIGHT ATRIUM: The atrium was mildly dilated  MITRAL VALVE: There was mild annular calcification  DOPPLER: There was no evidence for stenosis  There was mild regurgitation  AORTIC VALVE: The valve was trileaflet  Leaflets exhibited mildly to moderately increased thickness, mild to moderate calcification, and normal cuspal separation  DOPPLER: Transaortic velocity was minimally increased  There was no evidence  for stenosis  There was no significant regurgitation  TRICUSPID VALVE: DOPPLER: There was mild regurgitation  Estimated peak PA pressure was 25 mmHg  PULMONIC VALVE: DOPPLER: There was no significant regurgitation  PERICARDIUM: There was no thickening or calcification  There was no pericardial effusion  AORTA: The root exhibited normal size  SYSTEMIC VEINS: IVC: The inferior vena cava was normal in size   Respirophasic changes were normal     SYSTEM MEASUREMENT TABLES    2D mode  AoR Diam 2D: 3 4 cm  LA Diam (2D): 3 9 cm  LA/Ao (2D): 1 15  FS (2D Teich): 30 %  IVSd (2D): 1 18 cm  LVDEV: 70 cm³  LVESV: 29 6 cm³  LVIDd(2D): 4 cm  LVISd (2D): 2 8 cm  LVOT Area 2D: 3 14 cm squared  LVPWd (2D): 1 24 cm  SV (Teich): 40 4 cm³    Apical four chamber  LVEDV MOD A4C: 119 cm³  LVEF A4C: 51 %  LVLD A4C: 8 09 cm    Unspecified Scan Mode  YECENIA Cont Eq (Peak Demario): 2 1 cm squared  LVOT Diam : 2 cm  LVOT Vmax: 1010 mm/s  LVOT Vmax; Mean: 1010 mm/s  Peak Grad ; Mean: 4 mm[Hg]  MV Peak A Demario: 721 mm/s  MV Peak E Demario  Mean: 869 mm/s  MVA (PHT): 3 79 cm squared  PHT: 58 ms  Max P mm[Hg]  V Max: 2250 mm/s  Vmax: 2170 mm/s  RA Area: 18 4 cm squared  RA Volume: 48 cm³  TAPSE: 1 7 cm    [facility] Accredited Echocardiography Laboratory    Prepared and electronically signed by    Melissa Martinez MD  Signed [id number] 11:28:29       No results found for this or any previous visit  No results found for this or any previous visit  No results found for this or any previous visit  Marco Antonio Forman  Please call with any questions or suggestions    A description of the counseling:   Goals and Barriers:  Patient's ability to self care:  Medication side effect reviewed with patient in detail and all their questions answered  "This note has been constructed using a voice recognition system  Therefore there may be syntax, spelling, and/or grammatical errors   Please call if you have any questions  "

## 2019-03-12 ENCOUNTER — HOSPITAL ENCOUNTER (OUTPATIENT)
Dept: NON INVASIVE DIAGNOSTICS | Facility: HOSPITAL | Age: 54
Discharge: HOME | End: 2019-03-12
Attending: INTERNAL MEDICINE
Payer: COMMERCIAL

## 2019-03-12 DIAGNOSIS — R07.9 CHEST PAIN IN ADULT: ICD-10-CM

## 2019-03-12 DIAGNOSIS — I10 HTN (HYPERTENSION), BENIGN: ICD-10-CM

## 2019-03-12 PROCEDURE — 93016 CV STRESS TEST SUPVJ ONLY: CPT | Performed by: INTERNAL MEDICINE

## 2019-03-12 PROCEDURE — 93017 CV STRESS TEST TRACING ONLY: CPT

## 2019-03-12 PROCEDURE — 93018 CV STRESS TEST I&R ONLY: CPT | Performed by: INTERNAL MEDICINE

## 2019-03-13 ENCOUNTER — TELEPHONE (OUTPATIENT)
Dept: CARDIOLOGY CLINIC | Facility: CLINIC | Age: 54
End: 2019-03-13

## 2019-03-13 LAB
CHEST PAIN STATEMENT: NORMAL
MAX DIASTOLIC BP: 82 MMHG
MAX HEART RATE: 150 BPM
MAX PREDICTED HEART RATE: 165 BPM
MAX. SYSTOLIC BP: 180 MMHG
PROTOCOL NAME: NORMAL
REASON FOR TERMINATION: NORMAL
TARGET HR FORMULA: NORMAL
TIME IN EXERCISE PHASE: NORMAL

## 2019-03-13 NOTE — TELEPHONE ENCOUNTER
----- Message from Akosua Mcneill MD sent at 3/12/2019  5:02 PM EDT -----  Stress test was negative for evidence of blocked arteries  BP was controlled

## 2019-12-24 DIAGNOSIS — I10 HTN (HYPERTENSION), BENIGN: ICD-10-CM

## 2019-12-26 RX ORDER — NEBIVOLOL HYDROCHLORIDE 10 MG/1
TABLET ORAL
Qty: 90 TABLET | Refills: 3 | Status: SHIPPED | OUTPATIENT
Start: 2019-12-26

## 2020-01-07 DIAGNOSIS — I10 HTN (HYPERTENSION), BENIGN: ICD-10-CM

## 2020-01-09 RX ORDER — AMLODIPINE BESYLATE 5 MG/1
5 TABLET ORAL
Qty: 90 TABLET | Refills: 3 | Status: SHIPPED | OUTPATIENT
Start: 2020-01-09

## 2020-03-26 ENCOUNTER — APPOINTMENT (EMERGENCY)
Dept: RADIOLOGY | Facility: HOSPITAL | Age: 55
End: 2020-03-26
Payer: COMMERCIAL

## 2020-03-26 ENCOUNTER — HOSPITAL ENCOUNTER (EMERGENCY)
Facility: HOSPITAL | Age: 55
Discharge: HOME | End: 2020-03-26
Attending: EMERGENCY MEDICINE | Admitting: EMERGENCY MEDICINE
Payer: COMMERCIAL

## 2020-03-26 VITALS
HEART RATE: 80 BPM | DIASTOLIC BLOOD PRESSURE: 88 MMHG | WEIGHT: 202 LBS | TEMPERATURE: 97.7 F | RESPIRATION RATE: 18 BRPM | BODY MASS INDEX: 32.6 KG/M2 | SYSTOLIC BLOOD PRESSURE: 149 MMHG | OXYGEN SATURATION: 99 %

## 2020-03-26 DIAGNOSIS — J06.9 URI (UPPER RESPIRATORY INFECTION): ICD-10-CM

## 2020-03-26 DIAGNOSIS — R05.9 COUGH: Primary | ICD-10-CM

## 2020-03-26 LAB
FLUAV RNA NPH QL NAA+PROBE: NORMAL
FLUBV RNA NPH QL NAA+PROBE: NORMAL
RSV RNA NPH QL NAA+PROBE: NORMAL

## 2020-03-26 PROCEDURE — 87631 RESP VIRUS 3-5 TARGETS: CPT | Performed by: EMERGENCY MEDICINE

## 2020-03-26 PROCEDURE — 99284 EMERGENCY DEPT VISIT MOD MDM: CPT | Performed by: EMERGENCY MEDICINE

## 2020-03-26 PROCEDURE — 87635 SARS-COV-2 COVID-19 AMP PRB: CPT | Performed by: EMERGENCY MEDICINE

## 2020-03-26 PROCEDURE — 99285 EMERGENCY DEPT VISIT HI MDM: CPT

## 2020-03-26 PROCEDURE — 71045 X-RAY EXAM CHEST 1 VIEW: CPT

## 2020-03-26 RX ORDER — ALBUTEROL SULFATE 90 UG/1
2 AEROSOL, METERED RESPIRATORY (INHALATION) EVERY 4 HOURS PRN
Qty: 1 INHALER | Refills: 0 | Status: SHIPPED | OUTPATIENT
Start: 2020-03-26

## 2020-03-31 LAB — SARS-COV-2 RNA SPEC QL NAA+PROBE: NOT DETECTED

## 2022-08-03 ENCOUNTER — RA CDI HCC (OUTPATIENT)
Dept: OTHER | Facility: HOSPITAL | Age: 57
End: 2022-08-03

## 2022-08-03 NOTE — PROGRESS NOTES
[facility]  coding opportunities       Chart reviewed, no opportunity found: CHART REVIEWED, NO OPPORTUNITY FOUND        Patients Insurance        Commercial Insurance: [employer]

## 2022-08-09 PROBLEM — E78.5 HYPERLIPIDEMIA: Status: ACTIVE | Noted: 2017-10-24

## 2022-08-09 PROBLEM — M75.01 ADHESIVE CAPSULITIS OF RIGHT SHOULDER: Status: ACTIVE | Noted: 2021-01-13

## 2022-08-09 PROBLEM — R53.82 CHRONIC FATIGUE: Status: ACTIVE | Noted: 2017-10-24

## 2022-08-09 PROBLEM — F41.0 GENERALIZED ANXIETY DISORDER WITH PANIC ATTACKS: Status: ACTIVE | Noted: 2022-08-09

## 2022-08-09 PROBLEM — R06.00 DYSPNEA ON EXERTION: Status: ACTIVE | Noted: 2017-10-24

## 2022-08-09 PROBLEM — M75.21 BICEPS TENDONITIS ON RIGHT: Status: ACTIVE | Noted: 2021-01-13

## 2022-08-09 PROBLEM — F41.1 GENERALIZED ANXIETY DISORDER WITH PANIC ATTACKS: Status: ACTIVE | Noted: 2022-08-09

## 2022-08-09 PROBLEM — R06.09 DYSPNEA ON EXERTION: Status: ACTIVE | Noted: 2017-10-24

## 2022-08-10 ENCOUNTER — OFFICE VISIT (OUTPATIENT)
Dept: INTERNAL MEDICINE CLINIC | Facility: CLINIC | Age: 57
End: 2022-08-10
Payer: COMMERCIAL

## 2022-08-10 VITALS
DIASTOLIC BLOOD PRESSURE: 80 MMHG | OXYGEN SATURATION: 99 % | HEIGHT: 66 IN | WEIGHT: 193 LBS | HEART RATE: 85 BPM | BODY MASS INDEX: 31.02 KG/M2 | SYSTOLIC BLOOD PRESSURE: 144 MMHG | TEMPERATURE: 97.5 F

## 2022-08-10 DIAGNOSIS — G47.33 OBSTRUCTIVE SLEEP APNEA: Primary | ICD-10-CM

## 2022-08-10 DIAGNOSIS — M75.01 ADHESIVE CAPSULITIS OF RIGHT SHOULDER: ICD-10-CM

## 2022-08-10 DIAGNOSIS — F41.1 GENERALIZED ANXIETY DISORDER WITH PANIC ATTACKS: ICD-10-CM

## 2022-08-10 DIAGNOSIS — R53.82 CHRONIC FATIGUE: ICD-10-CM

## 2022-08-10 DIAGNOSIS — F41.0 GENERALIZED ANXIETY DISORDER WITH PANIC ATTACKS: ICD-10-CM

## 2022-08-10 DIAGNOSIS — M75.21 BICEPS TENDONITIS ON RIGHT: ICD-10-CM

## 2022-08-10 PROBLEM — G47.20 ABNORMAL CIRCADIAN RHYTHM: Status: ACTIVE | Noted: 2017-10-24

## 2022-08-10 PROCEDURE — 99213 OFFICE O/P EST LOW 20 MIN: CPT | Performed by: INTERNAL MEDICINE

## 2022-08-10 RX ORDER — ALPRAZOLAM 0.5 MG/1
0.5 TABLET ORAL
Qty: 30 TABLET | Refills: 1 | Status: SHIPPED | OUTPATIENT
Start: 2022-08-10 | End: 2022-10-11 | Stop reason: SDUPTHER

## 2022-08-10 NOTE — ASSESSMENT & PLAN NOTE
Recommended diet exercise no treatment for now patient is on Lexapro for anxiety depression explained possibility side effects from Lexapro and Xanax causing some tired and weak fatigue symptoms

## 2022-08-10 NOTE — PATIENT INSTRUCTIONS
Follow with Consultants as per their and our suggestion      Follow all instructions as advised and discussed  Take your medications as prescribed  Call the office immediately if you experience any side effects  Ask questions if you do not understand  Keep your scheduled appointment as advised or come sooner if necessary or in doubt  Best time to call for non-urgent matter or questions on weekdays is between 9am and 12 noon  See physician for any new symptoms or worsening of current symptoms  Urgent or emergent situations call 911 and report to nearest emergency room

## 2022-08-10 NOTE — ASSESSMENT & PLAN NOTE
Patient recommended to use and follow-up with Pulmonary patient is in patient sinus rhythm no evidence of any atrial fibrillation advised to use CPAP and lose weight

## 2022-08-10 NOTE — PROGRESS NOTES
Dr Alverto Andersen Office Visit Note  08/10/22     Nicci Urbina 59 y o  male MRN: [id number]  : 1965    Assessment:     1  Obstructive sleep apnea  Assessment & Plan:  Patient recommended to use and follow-up with Pulmonary patient is in patient sinus rhythm no evidence of any atrial fibrillation advised to use CPAP and lose weight      2  Adhesive capsulitis of right shoulder  Assessment & Plan:  Patient using over-the-counter ibuprofen as needed if gets worse tosee orthopedist      3  Generalized anxiety disorder with panic attacks  -     ALPRAZolam (XANAX) 0 5 mg tablet; Take 1 tablet (0 5 mg total) by mouth daily at bedtime as needed for anxiety    4  Biceps tendonitis on right    5  Chronic fatigue  Assessment & Plan:  Recommended diet exercise no treatment for now patient is on Lexapro for anxiety depression explained possibility side effects from Lexapro and Xanax causing some tired and weak fatigue symptoms          Discussion Summary and Plan: Today's care plan and medications were reviewed with patient in detail and all their questions answered to their satisfaction  Chief Complaint   Patient presents with    Hyperlipidemia    Anxiety    Follow-up      Subjective:  Came in for follow-up medication refill follow-up for bursitis tender shoulder anxiety sleep apnea denies any new symptom at present time no chest pain no difficulty breathing no fever chills or abdominal pain      The following portions of the patient's history were reviewed and updated as appropriate: allergies, current medications, past family history, past medical history, past social history, past surgical history and problem list     Review of Systems   Constitutional: Negative for activity change, appetite change, chills, diaphoresis, fatigue, fever and unexpected weight change     HENT: Negative for congestion, dental problem, drooling, ear discharge, ear pain, facial swelling, hearing loss, mouth sores, nosebleeds, postnasal drip, rhinorrhea, sinus pressure, sneezing, sore throat, tinnitus, trouble swallowing and voice change  Eyes: Negative for photophobia, pain, discharge, redness, itching and visual disturbance  Respiratory: Negative for apnea, cough, choking, chest tightness, shortness of breath, wheezing and stridor  Cardiovascular: Negative for chest pain, palpitations and leg swelling  Gastrointestinal: Negative for abdominal distention, abdominal pain, anal bleeding, blood in stool, constipation, diarrhea, nausea, rectal pain and vomiting  Endocrine: Negative for cold intolerance, heat intolerance, polydipsia, polyphagia and polyuria  Genitourinary: Negative for decreased urine volume, difficulty urinating, dysuria, enuresis, flank pain, frequency, genital sores, hematuria and urgency  Musculoskeletal: Negative for arthralgias, back pain, gait problem, joint swelling, myalgias, neck pain and neck stiffness  Skin: Negative for color change, pallor, rash and wound  Allergic/Immunologic: Negative  Negative for environmental allergies, food allergies and immunocompromised state  Neurological: Negative for dizziness, tremors, seizures, syncope, facial asymmetry, speech difficulty, weakness, light-headedness, numbness and headaches  Psychiatric/Behavioral: Negative for agitation, behavioral problems, confusion, decreased concentration, dysphoric mood, hallucinations, self-injury, sleep disturbance and suicidal ideas  The patient is not nervous/anxious and is not hyperactive  PHQ-2/9 Depression Screening         PHQ-2/9 Depression Screening         ANNITA-7 Flowsheet Screening    Flowsheet Row Most Recent Value   Over the last 2 weeks, how often have you been bothered by any of the following problems?     Not being able to stop or control worrying 0   Worrying too much about different things 0   Trouble relaxing 1   Being so restless that it is hard to sit still 0   Becoming easily annoyed or irritable 0 Feeling afraid as if something awful might happen 1        Historical Information   Patient Active Problem List   Diagnosis    Obstructive sleep apnea    Hypertension    Adhesive capsulitis of right shoulder    Biceps tendonitis on right    Chronic fatigue    Dyspnea on exertion    Hyperlipidemia    Generalized anxiety disorder with panic attacks    Abnormal circadian rhythm     Past Medical History:   Diagnosis Date    Anxiety     Asthma     Depression     Dizziness     High cholesterol     Hypertension     Lightheadedness     Psychiatric disorder     depression anxiety     Past Surgical History:   Procedure Laterality Date    ROTATOR CUFF REPAIR  10/24/2017    SHOULDER SURGERY Right      Social History     Substance and Sexual Activity   Alcohol Use Yes    Comment: Occasional     Social History     Substance and Sexual Activity   Drug Use No     Social History     Tobacco Use   Smoking Status Never Smoker   Smokeless Tobacco Never Used     Family History   Problem Relation Age of Onset    Stroke Mother     Coronary artery disease Mother     Diabetes Mother     Hypertension Mother     Hyperlipidemia Mother     Diabetes Father     Hypertension Father     Hyperlipidemia Father     Heart attack Daughter      Health Maintenance Due   Topic    Hepatitis C Screening     COVID-19 Vaccine (1)    Depression Screening     HIV Screening     BMI: Followup Plan     Annual Physical     DTaP,Tdap,and Td Vaccines (1 - Tdap)    Colorectal Cancer Screening     Influenza Vaccine (1)      Meds/Allergies       Current Outpatient Medications:     ALPRAZolam (XANAX) 0 5 mg tablet, Take 1 tablet by mouth daily, Disp: , Rfl:     ALPRAZolam (XANAX) 0 5 mg tablet, Take 1 tablet (0 5 mg total) by mouth daily at bedtime as needed for anxiety, Disp: 30 tablet, Rfl: 1    amLODIPine (NORVASC) 5 mg tablet, Take 1 tablet (5 mg total) by mouth daily at bedtime, Disp: 90 tablet, Rfl: 3    DULoxetine (CYMBALTA) 60 mg delayed release capsule, Take 20 mg by mouth daily, Disp: , Rfl:     escitalopram (LEXAPRO) 20 mg tablet, Take 1 tablet by mouth daily, Disp: , Rfl:     nitroglycerin (NITROSTAT) 0 4 mg SL tablet, Place 1 tablet under the tongue every 5 (five) minutes as needed, Disp: , Rfl:     QUEtiapine (SEROquel) 25 mg tablet, Take 25 mg by mouth daily at bedtime, Disp: , Rfl:     BYSTOLIC 10 MG tablet, TAKE 1 TABLET BY MOUTH  DAILY (Patient not taking: Reported on 8/10/2022), Disp: 90 tablet, Rfl: 3      Objective:    Vitals:   /80   Pulse 85   Temp 97 5 °F (36 4 °C)   Ht 5' 6" (1 676 m)   Wt 87 5 kg (193 lb)   SpO2 99%   BMI 31 15 kg/m²   Body mass index is 31 15 kg/m²  Vitals:    08/10/22 1427   Weight: 87 5 kg (193 lb)       Physical Exam  Constitutional:       General: He is not in acute distress  Appearance: He is well-developed  He is obese  He is not ill-appearing, toxic-appearing or diaphoretic  HENT:      Head: Normocephalic and atraumatic  Right Ear: External ear normal       Left Ear: External ear normal       Nose: Nose normal       Mouth/Throat:      Pharynx: No oropharyngeal exudate  Eyes:      General: Lids are normal  Lids are everted, no foreign bodies appreciated  No scleral icterus  Right eye: No discharge  Left eye: No discharge  Conjunctiva/sclera: Conjunctivae normal       Pupils: Pupils are equal, round, and reactive to light  Neck:      Thyroid: No thyromegaly  Vascular: Normal carotid pulses  No carotid bruit, hepatojugular reflux or JVD  Trachea: No tracheal tenderness or tracheal deviation  Cardiovascular:      Rate and Rhythm: Normal rate and regular rhythm  Pulses: Normal pulses  Heart sounds: Normal heart sounds  No murmur heard  No friction rub  No gallop  Pulmonary:      Effort: Pulmonary effort is normal  No respiratory distress  Breath sounds: Normal breath sounds  No stridor   No wheezing or rales    Chest:      Chest wall: No tenderness  Abdominal:      General: Bowel sounds are normal  There is no distension  Palpations: Abdomen is soft  There is no mass  Tenderness: There is no abdominal tenderness  There is no guarding or rebound  Musculoskeletal:         General: No tenderness or deformity  Normal range of motion  Cervical back: Normal range of motion and neck supple  No edema, erythema or rigidity  No spinous process tenderness or muscular tenderness  Normal range of motion  Lymphadenopathy:      Head:      Right side of head: No submental, submandibular, tonsillar, preauricular or posterior auricular adenopathy  Left side of head: No submental, submandibular, tonsillar, preauricular, posterior auricular or occipital adenopathy  Cervical: No cervical adenopathy  Right cervical: No superficial, deep or posterior cervical adenopathy  Left cervical: No superficial, deep or posterior cervical adenopathy  Upper Body:      Right upper body: No pectoral adenopathy  Left upper body: No pectoral adenopathy  Skin:     General: Skin is warm and dry  Coloration: Skin is not pale  Findings: No erythema or rash  Neurological:      Mental Status: He is alert and oriented to person, place, and time  Cranial Nerves: No cranial nerve deficit  Sensory: No sensory deficit  Motor: No tremor, abnormal muscle tone or seizure activity  Coordination: Coordination normal       Gait: Gait normal       Deep Tendon Reflexes: Reflexes are normal and symmetric  Reflexes normal    Psychiatric:         Behavior: Behavior normal          Thought Content: Thought content normal          Judgment: Judgment normal          Lab Review   No visits with results within 2 Month(s) from this visit     Latest known visit with results is:   Admission on 03/26/2020, Discharged on 03/26/2020   Component Date Value Ref Range Status    SARS-CoV-2  03/26/2020 Not Detected  Not Detected Final    Testing was performed using the josiah(R) SARS-CoV-2 test   This test was developed and its performance characteristics determined  by [employer]  This test has not been FDA cleared or  approved  This test has been authorized by FDA under an Emergency Use  Authorization (EUA)  This test is only authorized for the duration of  time the declaration that circumstances exist justifying the  authorization of the emergency use of in vitro diagnostic tests for  detection of SARS-CoV-2 virus and/or diagnosis of COVID-19 infection  under section 564(b)(1) of the Act, 21 U  S C  298AZJ-2(B)(7), unless  the authorization is terminated or revoked sooner   INFLUENZA A PCR 03/26/2020 None Detected  None Detected Final    INFLUENZA B PCR 03/26/2020 None Detected  None Detected Final    RSV PCR 03/26/2020 None Detected  None Detected Final         Patient Instructions   Follow with Consultants as per their and our suggestion      Follow all instructions as advised and discussed  Take your medications as prescribed  Call the office immediately if you experience any side effects  Ask questions if you do not understand  Keep your scheduled appointment as advised or come sooner if necessary or in doubt  Best time to call for non-urgent matter or questions on weekdays is between 9am and 12 noon  See physician for any new symptoms or worsening of current symptoms  Urgent or emergent situations call 911 and report to nearest emergency room  Matthew Cuellar MD        "This note has been constructed using a voice recognition system  Therefore there may be syntax, spelling, and/or grammatical errors   Please call if you have any questions  "

## 2022-10-11 ENCOUNTER — OFFICE VISIT (OUTPATIENT)
Dept: INTERNAL MEDICINE CLINIC | Facility: CLINIC | Age: 57
End: 2022-10-11
Payer: COMMERCIAL

## 2022-10-11 VITALS
OXYGEN SATURATION: 98 % | WEIGHT: 195 LBS | HEART RATE: 60 BPM | RESPIRATION RATE: 14 BRPM | DIASTOLIC BLOOD PRESSURE: 80 MMHG | SYSTOLIC BLOOD PRESSURE: 130 MMHG | TEMPERATURE: 98 F | BODY MASS INDEX: 31.34 KG/M2 | HEIGHT: 66 IN

## 2022-10-11 DIAGNOSIS — F41.1 GENERALIZED ANXIETY DISORDER WITH PANIC ATTACKS: ICD-10-CM

## 2022-10-11 DIAGNOSIS — R53.82 CHRONIC FATIGUE: ICD-10-CM

## 2022-10-11 DIAGNOSIS — I10 PRIMARY HYPERTENSION: ICD-10-CM

## 2022-10-11 DIAGNOSIS — G47.33 OBSTRUCTIVE SLEEP APNEA: Primary | ICD-10-CM

## 2022-10-11 DIAGNOSIS — F41.0 GENERALIZED ANXIETY DISORDER WITH PANIC ATTACKS: ICD-10-CM

## 2022-10-11 DIAGNOSIS — M75.21 BICEPS TENDONITIS ON RIGHT: ICD-10-CM

## 2022-10-11 DIAGNOSIS — E78.2 MIXED HYPERLIPIDEMIA: ICD-10-CM

## 2022-10-11 PROCEDURE — 99213 OFFICE O/P EST LOW 20 MIN: CPT | Performed by: INTERNAL MEDICINE

## 2022-10-11 RX ORDER — ALPRAZOLAM 0.5 MG/1
0.5 TABLET ORAL
Qty: 30 TABLET | Refills: 1 | Status: SHIPPED | OUTPATIENT
Start: 2022-10-11

## 2022-10-11 NOTE — PATIENT INSTRUCTIONS
Pt is symptom free for this problem    This diagnosis or problem is stable/well controlled  Patient is advised to continue same meds as outlined in medicine list  Please mail the specimen for Cologuard testing

## 2022-10-11 NOTE — PROGRESS NOTES
Dr Slime Jones Office Visit Note  10/11/22     Sandoval Sellers 59 y o  male MRN: [id number]  : 1965    Assessment:     1  Obstructive sleep apnea  Assessment & Plan:  Not able to use CPAP co the discomfort not using at present time asymptomatic      2  Primary hypertension  Assessment & Plan:  Patient blood pressure control for now low-salt diet exercise discussed at length controlled no symptoms continue current therapy on amlodipine Bystolic discontinued      3  Chronic fatigue  Assessment & Plan:  Recommended diet exercise no treatment for now patient is on Lexapro for anxiety depression explained possibility side effects from Lexapro and Xanax causing some tired and weak fatigue symptoms improved      4  Mixed hyperlipidemia    5  Generalized anxiety disorder with panic attacks  Assessment & Plan:  Prescribe Xanax 0 51 once a day as needed 1 month supply 1 refill continue current therapy    Orders:  -     Cologuard  -     ALPRAZolam (XANAX) 0 5 mg tablet; Take 1 tablet (0 5 mg total) by mouth daily at bedtime as needed for anxiety    6  Biceps tendonitis on right  Assessment & Plan:  Improved finish physical therapy back to full-time work            Discussion Summary and Plan: Today's care plan and medications were reviewed with patient in detail and all their questions answered to their satisfaction  No chief complaint on file  Subjective:  Came in follow-up for the anxiety with panic attack chronic anxiety taking Xanax 0 5 want once a day which is helping to control the anxiety symptoms also blood pressure controlled no other new symptoms no other new medical problems  ANNITA-7 Flowsheet Screening    Flowsheet Row Most Recent Value   Over the last 2 weeks, how often have you been bothered by any of the   following problems?     Feeling nervous, anxious, or on edge 0   Not being able to stop or control worrying 0   Worrying too much about different things 0   Trouble relaxing 0   Being so restless that it is hard to sit still 0   Becoming easily annoyed or irritable 0   Feeling afraid as if something awful might happen 0   ANNITA-7 Total Score 0        BMI Counseling: Body mass index is 31 47 kg/m²  The BMI is above normal  Nutrition recommendations include decreasing portion sizes, encouraging healthy choices of fruits and vegetables, decreasing fast food intake, consuming healthier snacks, limiting drinks that contain sugar, moderation in carbohydrate intake, increasing intake of lean protein, reducing intake of saturated and trans fat and reducing intake of cholesterol  Exercise recommendations include exercising 3-5 times per week  No pharmacotherapy was ordered  Patient referred to PCP  Rationale for BMI follow-up plan is due to patient being overweight or obese  Depression Screening and Follow-up Plan: Patient was screened for depression during today's encounter  They screened negative with a PHQ-2 score of 2  The following portions of the patient's history were reviewed and updated as appropriate: allergies, current medications, past family history, past medical history, past social history, past surgical history and problem list     Review of Systems   Constitutional: Negative for activity change, appetite change, chills, diaphoresis, fatigue, fever and unexpected weight change  HENT: Negative for congestion, dental problem, drooling, ear discharge, ear pain, facial swelling, hearing loss, mouth sores, nosebleeds, postnasal drip, rhinorrhea, sinus pressure, sneezing, sore throat, tinnitus, trouble swallowing and voice change  Eyes: Negative for photophobia, pain, discharge, redness, itching and visual disturbance  Respiratory: Negative for apnea, cough, choking, chest tightness, shortness of breath, wheezing and stridor  Cardiovascular: Negative for chest pain, palpitations and leg swelling     Gastrointestinal: Negative for abdominal distention, abdominal pain, anal bleeding, blood in stool, constipation, diarrhea, nausea, rectal pain and vomiting  Endocrine: Negative for cold intolerance, heat intolerance, polydipsia, polyphagia and polyuria  Genitourinary: Negative for decreased urine volume, difficulty urinating, dysuria, enuresis, flank pain, frequency, genital sores, hematuria and urgency  Musculoskeletal: Positive for arthralgias, back pain, joint swelling, myalgias and neck pain  Negative for gait problem and neck stiffness  Skin: Negative for color change, pallor, rash and wound  Allergic/Immunologic: Negative  Negative for environmental allergies, food allergies and immunocompromised state  Neurological: Negative for dizziness, tremors, seizures, syncope, facial asymmetry, speech difficulty, weakness, light-headedness, numbness and headaches  Psychiatric/Behavioral: Negative for agitation, behavioral problems, confusion, decreased concentration, dysphoric mood, hallucinations, self-injury, sleep disturbance and suicidal ideas  The patient is nervous/anxious  The patient is not hyperactive            Historical Information   Patient Active Problem List   Diagnosis   • Obstructive sleep apnea   • Hypertension   • Adhesive capsulitis of right shoulder   • Biceps tendonitis on right   • Chronic fatigue   • Dyspnea on exertion   • Hyperlipidemia   • Generalized anxiety disorder with panic attacks   • Abnormal circadian rhythm     Past Medical History:   Diagnosis Date   • Anxiety    • Asthma    • Depression    • Dizziness    • High cholesterol    • Hypertension    • Lightheadedness    • Psychiatric disorder     depression anxiety     Past Surgical History:   Procedure Laterality Date   • ROTATOR CUFF REPAIR  10/24/2017   • SHOULDER SURGERY Right      Social History     Substance and Sexual Activity   Alcohol Use Yes    Comment: Occasional     Social History     Substance and Sexual Activity   Drug Use No     Social History     Tobacco Use   Smoking Status Never Smoker Smokeless Tobacco Never Used     Family History   Problem Relation Age of Onset   • Stroke Mother    • Coronary artery disease Mother    • Diabetes Mother    • Hypertension Mother    • Hyperlipidemia Mother    • Diabetes Father    • Hypertension Father    • Hyperlipidemia Father    • Heart attack Daughter      Health Maintenance Due   Topic   • Hepatitis C Screening    • COVID-19 Vaccine (1)   • HIV Screening    • BMI: Followup Plan    • Annual Physical    • DTaP,Tdap,and Td Vaccines (1 - Tdap)   • Colorectal Cancer Screening    • Influenza Vaccine (1)      Meds/Allergies       Current Outpatient Medications:   •  ALPRAZolam (XANAX) 0 5 mg tablet, Take 1 tablet by mouth daily, Disp: , Rfl:   •  ALPRAZolam (XANAX) 0 5 mg tablet, Take 1 tablet (0 5 mg total) by mouth daily at bedtime as needed for anxiety, Disp: 30 tablet, Rfl: 1  •  amLODIPine (NORVASC) 5 mg tablet, Take 1 tablet (5 mg total) by mouth daily at bedtime, Disp: 90 tablet, Rfl: 3  •  DULoxetine (CYMBALTA) 60 mg delayed release capsule, Take 20 mg by mouth daily, Disp: , Rfl:   •  escitalopram (LEXAPRO) 20 mg tablet, Take 1 tablet by mouth daily, Disp: , Rfl:   •  nitroglycerin (NITROSTAT) 0 4 mg SL tablet, Place 1 tablet under the tongue every 5 (five) minutes as needed, Disp: , Rfl:   •  QUEtiapine (SEROquel) 25 mg tablet, Take 25 mg by mouth daily at bedtime, Disp: , Rfl:       Objective:    Vitals:   /80   Pulse 60   Temp 98 °F (36 7 °C)   Resp 14   Ht 5' 6" (1 676 m)   Wt 88 5 kg (195 lb)   SpO2 98%   BMI 31 47 kg/m²   Body mass index is 31 47 kg/m²  Vitals:    10/11/22 1435   Weight: 88 5 kg (195 lb)       Physical Exam  Constitutional:       General: He is not in acute distress  Appearance: He is well-developed  He is not ill-appearing, toxic-appearing or diaphoretic  HENT:      Head: Normocephalic and atraumatic        Right Ear: External ear normal       Left Ear: External ear normal       Nose: Nose normal  Mouth/Throat:      Pharynx: No oropharyngeal exudate  Eyes:      General: Lids are normal  Lids are everted, no foreign bodies appreciated  No scleral icterus  Right eye: No discharge  Left eye: No discharge  Conjunctiva/sclera: Conjunctivae normal       Pupils: Pupils are equal, round, and reactive to light  Neck:      Thyroid: No thyromegaly  Vascular: Normal carotid pulses  No carotid bruit, hepatojugular reflux or JVD  Trachea: No tracheal tenderness or tracheal deviation  Cardiovascular:      Rate and Rhythm: Normal rate and regular rhythm  Pulses: Normal pulses  Heart sounds: Normal heart sounds  No murmur heard  No friction rub  No gallop  Pulmonary:      Effort: Pulmonary effort is normal  No respiratory distress  Breath sounds: Normal breath sounds  No stridor  No wheezing or rales  Chest:      Chest wall: No tenderness  Abdominal:      General: Bowel sounds are normal  There is no distension  Palpations: Abdomen is soft  There is no mass  Tenderness: There is no abdominal tenderness  There is no guarding or rebound  Musculoskeletal:         General: No tenderness or deformity  Normal range of motion  Cervical back: Normal range of motion and neck supple  No edema, erythema or rigidity  No spinous process tenderness or muscular tenderness  Normal range of motion  Lymphadenopathy:      Head:      Right side of head: No submental, submandibular, tonsillar, preauricular or posterior auricular adenopathy  Left side of head: No submental, submandibular, tonsillar, preauricular, posterior auricular or occipital adenopathy  Cervical: No cervical adenopathy  Right cervical: No superficial, deep or posterior cervical adenopathy  Left cervical: No superficial, deep or posterior cervical adenopathy  Upper Body:      Right upper body: No pectoral adenopathy  Left upper body: No pectoral adenopathy     Skin: General: Skin is warm and dry  Coloration: Skin is not pale  Findings: No erythema or rash  Neurological:      Mental Status: He is alert and oriented to person, place, and time  Cranial Nerves: No cranial nerve deficit  Sensory: No sensory deficit  Motor: No tremor, abnormal muscle tone or seizure activity  Coordination: Coordination normal       Gait: Gait normal       Deep Tendon Reflexes: Reflexes are normal and symmetric  Reflexes normal    Psychiatric:         Behavior: Behavior normal          Thought Content: Thought content normal          Judgment: Judgment normal          Lab Review   No visits with results within 2 Month(s) from this visit  Latest known visit with results is:   Admission on 03/26/2020, Discharged on 03/26/2020   Component Date Value Ref Range Status   • SARS-CoV-2  03/26/2020 Not Detected  Not Detected Final    Testing was performed using the josiah(R) SARS-CoV-2 test   This test was developed and its performance characteristics determined  by [facility]  This test has not been FDA cleared or  approved  This test has been authorized by FDA under an Emergency Use  Authorization (EUA)  This test is only authorized for the duration of  time the declaration that circumstances exist justifying the  authorization of the emergency use of in vitro diagnostic tests for  detection of SARS-CoV-2 virus and/or diagnosis of COVID-19 infection  under section 564(b)(1) of the Act, 21 U  S C  434UUG-6(Y)(3), unless  the authorization is terminated or revoked sooner  • INFLUENZA A PCR 03/26/2020 None Detected  None Detected Final   • INFLUENZA B PCR 03/26/2020 None Detected  None Detected Final   • RSV PCR 03/26/2020 None Detected  None Detected Final         There are no Patient Instructions on file for this visit  M[facility]        "This note has been constructed using a voice recognition system  Therefore there may be syntax, spelling, and/or grammatical errors   Please call if you have any questions  "

## 2022-10-11 NOTE — ASSESSMENT & PLAN NOTE
Recommended diet exercise no treatment for now patient is on Lexapro for anxiety depression explained possibility side effects from Lexapro and Xanax causing some tired and weak fatigue symptoms improved

## 2022-10-11 NOTE — ASSESSMENT & PLAN NOTE
Patient blood pressure control for now low-salt diet exercise discussed at length controlled no symptoms continue current therapy on amlodipine Bystolic discontinued

## 2022-10-20 LAB — COLOGUARD RESULT REPORTABLE: NEGATIVE

## 2022-11-15 ENCOUNTER — OFFICE VISIT (OUTPATIENT)
Dept: INTERNAL MEDICINE CLINIC | Facility: CLINIC | Age: 57
End: 2022-11-15

## 2022-11-15 VITALS
DIASTOLIC BLOOD PRESSURE: 90 MMHG | OXYGEN SATURATION: 98 % | TEMPERATURE: 98 F | HEIGHT: 66 IN | BODY MASS INDEX: 31.66 KG/M2 | HEART RATE: 86 BPM | WEIGHT: 197 LBS | RESPIRATION RATE: 16 BRPM | SYSTOLIC BLOOD PRESSURE: 150 MMHG

## 2022-11-15 DIAGNOSIS — F41.0 GENERALIZED ANXIETY DISORDER WITH PANIC ATTACKS: ICD-10-CM

## 2022-11-15 DIAGNOSIS — I10 PRIMARY HYPERTENSION: Primary | ICD-10-CM

## 2022-11-15 DIAGNOSIS — G47.33 OBSTRUCTIVE SLEEP APNEA: ICD-10-CM

## 2022-11-15 DIAGNOSIS — M75.21 BICEPS TENDONITIS ON RIGHT: ICD-10-CM

## 2022-11-15 DIAGNOSIS — F41.1 GENERALIZED ANXIETY DISORDER WITH PANIC ATTACKS: ICD-10-CM

## 2022-11-15 DIAGNOSIS — F32.5 MAJOR DEPRESSIVE DISORDER WITH SINGLE EPISODE, IN FULL REMISSION (HCC): ICD-10-CM

## 2022-11-15 NOTE — PROGRESS NOTES
Dr Vanita Hammond Office Visit Note  11/15/22     Francois Clakr 59 y o  male MRN: [id number]  : 1965    Assessment:     1  Primary hypertension  Assessment & Plan:  Controlled with current therapy low-salt diet continue amlodipine      2  Generalized anxiety disorder with panic attacks  Assessment & Plan:  Xanax 0 5 once a day as needed for anxiety attack FMLA papers filled out      3  Obstructive sleep apnea  Assessment & Plan:  Not using CPAP at present time but recommended Pulmonary follow-up      4  Major depressive disorder with single episode, in full remission [facility])  Assessment & Plan: On Cymbalta Lexapro patient's symptoms controlled in remission FMLA papers filled out      5  Biceps tendonitis on right  Assessment & Plan: For now improved after the physical therapy to use over-the-counter ibuprofen as needed          Discussion Summary and Plan: Today's care plan and medications were reviewed with patient in detail and all their questions answered to their satisfaction  No chief complaint on file  Subjective:  Patient came in to have FMLA papers to be filled out patient's overall condition unchanged is anxiety and panic attacks controlled with Xanax 0 5 once a day and also on Lexapro Cymbalta for depression which seems to be helping and symptoms controlled in remission no other new symptoms no other new medical problems   Patient here for review of chronic medical problems and review of the labs and imaging if it is applicable  Currently has no specific complaints other than mentioned in the review of systems  Denies chest pain, SOB, cough, abdominal pain, nausea, vomiting, fever, chills, lightheadedness, dizziness,headache, tingling or numbness  No bowel or bladder problem      PHQ-2/9 Depression Screening    Little interest or pleasure in doing things: 0 - not at all  Feeling down, depressed, or hopeless: 0 - not at all  Trouble falling or staying asleep, or sleeping too much: 0 - not at all  Feeling tired or having little energy: 0 - not at all  Poor appetite or overeatin - not at all  Feeling bad about yourself - or that you are a failure or have let yourself or your family down: 0 - not at all  Trouble concentrating on things, such as reading the newspaper or watching television: 0 - not at all  Moving or speaking so slowly that other people could have noticed  Or the opposite - being so fidgety or restless that you have been moving around a lot more than usual: 0 - not at all  Thoughts that you would be better off dead, or of hurting yourself in some way: 0 - not at all  PHQ-9 Score: 0   PHQ-9 Interpretation: No or Minimal depression            The following portions of the patient's history were reviewed and updated as appropriate: allergies, current medications, past family history, past medical history, past social history, past surgical history and problem list     Review of Systems   Constitutional: Positive for fatigue  Negative for activity change, appetite change, chills, diaphoresis, fever and unexpected weight change  HENT: Negative for congestion, dental problem, drooling, ear discharge, ear pain, facial swelling, hearing loss, mouth sores, nosebleeds, postnasal drip, rhinorrhea, sinus pressure, sneezing, sore throat, tinnitus, trouble swallowing and voice change  Eyes: Negative for photophobia, pain, discharge, redness, itching and visual disturbance  Respiratory: Negative for apnea, cough, choking, chest tightness, shortness of breath, wheezing and stridor  Cardiovascular: Negative for chest pain, palpitations and leg swelling  Gastrointestinal: Negative for abdominal distention, abdominal pain, anal bleeding, blood in stool, constipation, diarrhea, nausea, rectal pain and vomiting  Endocrine: Negative for cold intolerance, heat intolerance, polydipsia, polyphagia and polyuria     Genitourinary: Negative for decreased urine volume, difficulty urinating, dysuria, enuresis, flank pain, frequency, genital sores, hematuria and urgency  Musculoskeletal: Positive for arthralgias, joint swelling and myalgias  Negative for back pain, gait problem, neck pain and neck stiffness  Skin: Negative for color change, pallor, rash and wound  Allergic/Immunologic: Negative  Negative for environmental allergies, food allergies and immunocompromised state  Neurological: Negative for dizziness, tremors, seizures, syncope, facial asymmetry, speech difficulty, weakness, light-headedness, numbness and headaches  Psychiatric/Behavioral: Negative for agitation, behavioral problems, confusion, decreased concentration, dysphoric mood, hallucinations, self-injury, sleep disturbance and suicidal ideas  The patient is nervous/anxious  The patient is not hyperactive            Historical Information   Patient Active Problem List   Diagnosis   • Obstructive sleep apnea   • Hypertension   • Adhesive capsulitis of right shoulder   • Biceps tendonitis on right   • Chronic fatigue   • Dyspnea on exertion   • Hyperlipidemia   • Generalized anxiety disorder with panic attacks   • Abnormal circadian rhythm   • Major depressive disorder with single episode, in full remission [facility])     Past Medical History:   Diagnosis Date   • Anxiety    • Asthma    • Depression    • Dizziness    • High cholesterol    • Hypertension    • Lightheadedness    • Psychiatric disorder     depression anxiety     Past Surgical History:   Procedure Laterality Date   • ROTATOR CUFF REPAIR  10/24/2017   • SHOULDER SURGERY Right      Social History     Substance and Sexual Activity   Alcohol Use Yes    Comment: Occasional     Social History     Substance and Sexual Activity   Drug Use No     Social History     Tobacco Use   Smoking Status Never Smoker   Smokeless Tobacco Never Used     Family History   Problem Relation Age of Onset   • Stroke Mother    • Coronary artery disease Mother    • Diabetes Mother    • Hypertension Mother • Hyperlipidemia Mother    • Diabetes Father    • Hypertension Father    • Hyperlipidemia Father    • Heart attack Daughter      Health Maintenance Due   Topic   • Hepatitis C Screening    • COVID-19 Vaccine (1)   • Depression Remission PHQ    • HIV Screening    • Annual Physical    • DTaP,Tdap,and Td Vaccines (1 - Tdap)   • Influenza Vaccine (1)      Meds/Allergies       Current Outpatient Medications:   •  ALPRAZolam (XANAX) 0 5 mg tablet, Take 1 tablet by mouth daily, Disp: , Rfl:   •  ALPRAZolam (XANAX) 0 5 mg tablet, Take 1 tablet (0 5 mg total) by mouth daily at bedtime as needed for anxiety, Disp: 30 tablet, Rfl: 1  •  amLODIPine (NORVASC) 5 mg tablet, Take 1 tablet (5 mg total) by mouth daily at bedtime, Disp: 90 tablet, Rfl: 3  •  DULoxetine (CYMBALTA) 60 mg delayed release capsule, Take 20 mg by mouth daily, Disp: , Rfl:   •  escitalopram (LEXAPRO) 20 mg tablet, Take 1 tablet by mouth daily, Disp: , Rfl:   •  nitroglycerin (NITROSTAT) 0 4 mg SL tablet, Place 1 tablet under the tongue every 5 (five) minutes as needed, Disp: , Rfl:   •  QUEtiapine (SEROquel) 25 mg tablet, Take 25 mg by mouth daily at bedtime, Disp: , Rfl:       Objective:    Vitals:   /90   Pulse 86   Temp 98 °F (36 7 °C)   Resp 16   Ht 5' 6" (1 676 m)   Wt 89 4 kg (197 lb)   SpO2 98%   BMI 31 80 kg/m²   Body mass index is 31 8 kg/m²  Vitals:    11/15/22 1344   Weight: 89 4 kg (197 lb)       Physical Exam  Constitutional:       General: He is not in acute distress  Appearance: He is well-developed  He is not ill-appearing, toxic-appearing or diaphoretic  HENT:      Head: Normocephalic and atraumatic  Right Ear: External ear normal       Left Ear: External ear normal       Nose: Nose normal       Mouth/Throat:      Pharynx: No oropharyngeal exudate  Eyes:      General: Lids are normal  Lids are everted, no foreign bodies appreciated  No scleral icterus  Right eye: No discharge           Left eye: No discharge  Conjunctiva/sclera: Conjunctivae normal       Pupils: Pupils are equal, round, and reactive to light  Neck:      Thyroid: No thyromegaly  Vascular: Normal carotid pulses  No carotid bruit, hepatojugular reflux or JVD  Trachea: No tracheal tenderness or tracheal deviation  Cardiovascular:      Rate and Rhythm: Normal rate and regular rhythm  Pulses: Normal pulses  Heart sounds: Normal heart sounds  No murmur heard  No friction rub  No gallop  Pulmonary:      Effort: Pulmonary effort is normal  No respiratory distress  Breath sounds: Normal breath sounds  No stridor  No wheezing or rales  Chest:      Chest wall: No tenderness  Abdominal:      General: Bowel sounds are normal  There is no distension  Palpations: Abdomen is soft  There is no mass  Tenderness: There is no abdominal tenderness  There is no guarding or rebound  Musculoskeletal:         General: No tenderness or deformity  Normal range of motion  Cervical back: Normal range of motion and neck supple  No edema, erythema or rigidity  No spinous process tenderness or muscular tenderness  Normal range of motion  Comments: Right shoulder range of motion restricted painful   Lymphadenopathy:      Head:      Right side of head: No submental, submandibular, tonsillar, preauricular or posterior auricular adenopathy  Left side of head: No submental, submandibular, tonsillar, preauricular, posterior auricular or occipital adenopathy  Cervical: No cervical adenopathy  Right cervical: No superficial, deep or posterior cervical adenopathy  Left cervical: No superficial, deep or posterior cervical adenopathy  Upper Body:      Right upper body: No pectoral adenopathy  Left upper body: No pectoral adenopathy  Skin:     General: Skin is warm and dry  Coloration: Skin is not pale  Findings: No erythema or rash     Neurological:      Mental Status: He is alert and oriented to person, place, and time  Cranial Nerves: No cranial nerve deficit  Sensory: No sensory deficit  Motor: No tremor, abnormal muscle tone or seizure activity  Coordination: Coordination normal       Gait: Gait normal       Deep Tendon Reflexes: Reflexes are normal and symmetric  Reflexes normal    Psychiatric:         Behavior: Behavior normal          Thought Content: Thought content normal          Judgment: Judgment normal          Lab Review   Office Visit on 10/11/2022   Component Date Value Ref Range Status   • Cologuard Result 10/17/2022 Negative  Negative Final    Comment: NEGATIVE TEST RESULT  A negative Cologuard result indicates a low likelihood that a colorectal cancer (CRC) or advanced adenoma (adenomatous polyps with more advanced pre-malignant features)  is present  The chance that a person with a negative Cologuard   test has a colorectal cancer is less than 1 in 1500 (negative predictive value >99 9%) or has an  advanced adenoma is less than  5 3% (negative predictive value 94 7%)  These data are based on a prospective cross-sectional study of 10,000 individuals at   average risk for colorectal cancer who were screened with both Cologuard and colonoscopy  (Daniel T  et al, N Engl J Med 2014;370(14):2179-6800) The normal value (reference range) for this assay is negative  COLOGUARD RE-SCREENING RECOMMENDATION: Periodic colorectal cancer screening is an important part of preventive healthcare for asymptomatic individuals at average risk for colorectal cancer  Following a negative Cologuard result, the [place] Task Force screening guidelines recommend a Cologuard re-screening interval of 3 years  References: American Cancer Society Guideline for Colorectal Cancer Screening: https://www [place] com/  org/cancer/colon-rectal-cancer/ztxhcbimf-lcjqffcck-dxsbahj/acs-recommendations html ; Cailin Barrios, Lorena Mtz, Colorectal Cancer Screening:   Recommendations for Physicians and Patients from the [address] [place] [address] Task Force on Colorectal Cancer Screening , Am J Gastroenterology 2017; 629:1170-4541  TEST DESCRIPTION: Composite algorithmic analysis of stool DNA-biomarkers with hemoglobin immunoassay  Quantitative values of individual biomarkers are not reportable and are not associated with individual biomarker result reference ranges  Cologuard is   intended for colorectal cancer screening of adults of either sex, 39 years or older, who are at average-risk for colorectal cancer (CRC)  Cologuard has been approved for use by the [place]  FDA  The performance of Cologuard was established in                            a cross   sectional study of average-risk adults aged 48-80  Cologuard performance in patients ages 39 to 52 years was estimated by sub-group analysis of near-age groups  Colonoscopies performed for a positive result may find as the most clinically significant   lesion: colorectal cancer [4 0%], advanced adenoma (including sessile serrated polyps greater than or equal to 1cm diameter) [20%] or non- advanced adenoma [31%]; or no colorectal neoplasia [45%]  These estimates are derived from a prospective   cross-sectional screening study of 10,000 individuals at average risk for colorectal cancer who were screened with both Cologuard and colonoscopy  (Daniel T  et al, [facility] J Med 2014;370(14):7997-4537 ) Cologuard may produce a false negative or false   positive result (no colorectal cancer or precancerous polyp present at colonoscopy follow up)  A negative Cologuard test result does not guarantee the absence of CRC or advanced adenoma (pre-cancer)  The current Cologuard screening in                           [facility] is every 3   years  ([address]  [facility])   Cologuard performance data in a 10,000 patient pivotal study using colonoscopy as the reference method can be accessed at the following location: www [employer]/results  Additional   description of the Cologuard test process, warnings and precautions can be found at www [employer]  Patient Instructions   Pt is symptom free for this problem  This diagnosis or problem is stable/well controlled  Patient is advised to continue same meds as outlined in medicine list            Gina Maxwell MD        "This note has been constructed using a voice recognition system  Therefore there may be syntax, spelling, and/or grammatical errors   Please call if you have any questions  "

## 2022-12-12 ENCOUNTER — OFFICE VISIT (OUTPATIENT)
Dept: INTERNAL MEDICINE CLINIC | Facility: CLINIC | Age: 57
End: 2022-12-12

## 2022-12-12 VITALS
DIASTOLIC BLOOD PRESSURE: 78 MMHG | BODY MASS INDEX: 31.66 KG/M2 | HEIGHT: 66 IN | HEART RATE: 78 BPM | WEIGHT: 197 LBS | OXYGEN SATURATION: 98 % | SYSTOLIC BLOOD PRESSURE: 148 MMHG | TEMPERATURE: 97.8 F | RESPIRATION RATE: 15 BRPM

## 2022-12-12 DIAGNOSIS — E78.2 MIXED HYPERLIPIDEMIA: ICD-10-CM

## 2022-12-12 DIAGNOSIS — G47.33 OBSTRUCTIVE SLEEP APNEA: ICD-10-CM

## 2022-12-12 DIAGNOSIS — F32.A DEPRESSION, UNSPECIFIED DEPRESSION TYPE: Primary | ICD-10-CM

## 2022-12-12 DIAGNOSIS — I10 PRIMARY HYPERTENSION: Primary | ICD-10-CM

## 2022-12-12 DIAGNOSIS — I10 HTN (HYPERTENSION), BENIGN: ICD-10-CM

## 2022-12-12 DIAGNOSIS — F41.1 GENERALIZED ANXIETY DISORDER WITH PANIC ATTACKS: ICD-10-CM

## 2022-12-12 DIAGNOSIS — M75.01 ADHESIVE CAPSULITIS OF RIGHT SHOULDER: ICD-10-CM

## 2022-12-12 DIAGNOSIS — F41.0 GENERALIZED ANXIETY DISORDER WITH PANIC ATTACKS: ICD-10-CM

## 2022-12-12 RX ORDER — AMLODIPINE BESYLATE 5 MG/1
5 TABLET ORAL
Qty: 90 TABLET | Refills: 3 | Status: SHIPPED | OUTPATIENT
Start: 2022-12-12

## 2022-12-12 RX ORDER — ESCITALOPRAM OXALATE 20 MG/1
20 TABLET ORAL DAILY
Qty: 90 TABLET | Refills: 3 | Status: SHIPPED | OUTPATIENT
Start: 2022-12-12

## 2022-12-12 RX ORDER — ALPRAZOLAM 0.5 MG/1
0.5 TABLET ORAL DAILY
Qty: 30 TABLET | Refills: 0 | Status: SHIPPED | OUTPATIENT
Start: 2022-12-12

## 2022-12-12 NOTE — PROGRESS NOTES
Dr Soctt Brennan Office Visit Note  22     Ekaterina Granger 59 y o  male MRN: [id number]  : 1965    Assessment:     1  Primary hypertension  Assessment & Plan:  Blood pressure controlled continue low-salt diet and regular  Blood pressure monitoring      2  Obstructive sleep apnea  Assessment & Plan:  Not on a CPAP sun reevaluation and repeat study and patient reluctant      3  Adhesive capsulitis of right shoulder  Assessment & Plan:  Intermittent pain right shoulder can use ibuprofen as needed      4  Mixed hyperlipidemia    5  Generalized anxiety disorder with panic attacks  Assessment & Plan:  Xanax 0 5 once a day as needed symptoms controlled    Orders:  -     ALPRAZolam (XANAX) 0 5 mg tablet; Take 1 tablet (0 5 mg total) by mouth daily          Discussion Summary and Plan: Today's care plan and medications were reviewed with patient in detail and all their questions answered to their satisfaction  Chief Complaint   Patient presents with   • Medication Refill      Subjective:  Came in for follow-up chronic medical condition which is stable and also refill for the Xanax that patient takes 0 5 mg once a day for anxiety and panic attack seems that once a day 0 5 is helping patient sleep at nighttime control symptoms of anxiety in the daytime able to work full-time and carry out day-to-day activities no other symptoms no other new medical problems      The following portions of the patient's history were reviewed and updated as appropriate: allergies, current medications, past family history, past medical history, past social history, past surgical history and problem list     Review of Systems   Constitutional: Positive for fatigue  Negative for activity change, appetite change, chills, diaphoresis, fever and unexpected weight change     HENT: Negative for congestion, dental problem, drooling, ear discharge, ear pain, facial swelling, hearing loss, mouth sores, nosebleeds, postnasal drip, rhinorrhea, sinus pressure, sneezing, sore throat, tinnitus, trouble swallowing and voice change  Eyes: Negative for photophobia, pain, discharge, redness, itching and visual disturbance  Respiratory: Negative for apnea, cough, choking, chest tightness, shortness of breath, wheezing and stridor  Cardiovascular: Negative for chest pain, palpitations and leg swelling  Gastrointestinal: Negative for abdominal distention, abdominal pain, anal bleeding, blood in stool, constipation, diarrhea, nausea, rectal pain and vomiting  Endocrine: Negative for cold intolerance, heat intolerance, polydipsia, polyphagia and polyuria  Genitourinary: Negative for decreased urine volume, difficulty urinating, dysuria, enuresis, flank pain, frequency, genital sores, hematuria and urgency  Musculoskeletal: Negative for arthralgias, back pain, gait problem, joint swelling, myalgias, neck pain and neck stiffness  Skin: Negative for color change, pallor, rash and wound  Allergic/Immunologic: Negative  Negative for environmental allergies, food allergies and immunocompromised state  Neurological: Negative for dizziness, tremors, seizures, syncope, facial asymmetry, speech difficulty, weakness, light-headedness, numbness and headaches  Psychiatric/Behavioral: Negative for agitation, behavioral problems, confusion, decreased concentration, dysphoric mood, hallucinations, self-injury, sleep disturbance and suicidal ideas  The patient is nervous/anxious  The patient is not hyperactive            Historical Information   Patient Active Problem List   Diagnosis   • Obstructive sleep apnea   • Hypertension   • Adhesive capsulitis of right shoulder   • Biceps tendonitis on right   • Chronic fatigue   • Dyspnea on exertion   • Hyperlipidemia   • Generalized anxiety disorder with panic attacks   • Abnormal circadian rhythm   • Major depressive disorder with single episode, in full remission [facility])     Past Medical History:   Diagnosis Date • Anxiety    • Asthma    • Depression    • Dizziness    • High cholesterol    • Hypertension    • Lightheadedness    • Psychiatric disorder     depression anxiety     Past Surgical History:   Procedure Laterality Date   • ROTATOR CUFF REPAIR  10/24/2017   • SHOULDER SURGERY Right      Social History     Substance and Sexual Activity   Alcohol Use Yes    Comment: Occasional     Social History     Substance and Sexual Activity   Drug Use No     Social History     Tobacco Use   Smoking Status Never   Smokeless Tobacco Never     Family History   Problem Relation Age of Onset   • Stroke Mother    • Coronary artery disease Mother    • Diabetes Mother    • Hypertension Mother    • Hyperlipidemia Mother    • Diabetes Father    • Hypertension Father    • Hyperlipidemia Father    • Heart attack Daughter      Health Maintenance Due   Topic   • Hepatitis C Screening    • Hepatitis B Vaccine (1 of 3 - 3-dose series)   • COVID-19 Vaccine (1)   • HIV Screening    • Annual Physical    • DTaP,Tdap,and Td Vaccines (1 - Tdap)   • Influenza Vaccine (1)      Meds/Allergies       Current Outpatient Medications:   •  ALPRAZolam (XANAX) 0 5 mg tablet, Take 1 tablet (0 5 mg total) by mouth daily at bedtime as needed for anxiety, Disp: 30 tablet, Rfl: 1  •  ALPRAZolam (XANAX) 0 5 mg tablet, Take 1 tablet (0 5 mg total) by mouth daily, Disp: 30 tablet, Rfl: 0  •  DULoxetine (CYMBALTA) 60 mg delayed release capsule, Take 20 mg by mouth daily, Disp: , Rfl:   •  nitroglycerin (NITROSTAT) 0 4 mg SL tablet, Place 1 tablet under the tongue every 5 (five) minutes as needed, Disp: , Rfl:   •  QUEtiapine (SEROquel) 25 mg tablet, Take 25 mg by mouth daily at bedtime, Disp: , Rfl:   •  amLODIPine (NORVASC) 5 mg tablet, Take 1 tablet (5 mg total) by mouth daily at bedtime, Disp: 90 tablet, Rfl: 3  •  escitalopram (LEXAPRO) 20 mg tablet, Take 1 tablet (20 mg total) by mouth daily, Disp: 90 tablet, Rfl: 3      Objective:    Vitals:   /78   Pulse 78   Temp 97 8 °F (36 6 °C)   Resp 15   Ht 5' 6" (1 676 m)   Wt 89 4 kg (197 lb)   SpO2 98%   BMI 31 80 kg/m²   Body mass index is 31 8 kg/m²  Vitals:    12/12/22 1325   Weight: 89 4 kg (197 lb)       Physical Exam  Constitutional:       General: He is not in acute distress  Appearance: He is well-developed  He is obese  He is not ill-appearing, toxic-appearing or diaphoretic  HENT:      Head: Normocephalic and atraumatic  Right Ear: External ear normal       Left Ear: External ear normal       Nose: Nose normal       Mouth/Throat:      Pharynx: No oropharyngeal exudate  Eyes:      General: Lids are normal  Lids are everted, no foreign bodies appreciated  No scleral icterus  Right eye: No discharge  Left eye: No discharge  Conjunctiva/sclera: Conjunctivae normal       Pupils: Pupils are equal, round, and reactive to light  Neck:      Thyroid: No thyromegaly  Vascular: Normal carotid pulses  No carotid bruit, hepatojugular reflux or JVD  Trachea: No tracheal tenderness or tracheal deviation  Cardiovascular:      Rate and Rhythm: Normal rate and regular rhythm  Pulses: Normal pulses  Heart sounds: Normal heart sounds  No murmur heard  No friction rub  No gallop  Pulmonary:      Effort: Pulmonary effort is normal  No respiratory distress  Breath sounds: Normal breath sounds  No stridor  No wheezing or rales  Chest:      Chest wall: No tenderness  Abdominal:      General: Bowel sounds are normal  There is no distension  Palpations: Abdomen is soft  There is no mass  Tenderness: There is no abdominal tenderness  There is no guarding or rebound  Musculoskeletal:         General: No tenderness or deformity  Normal range of motion  Cervical back: Normal range of motion and neck supple  No edema, erythema or rigidity  No spinous process tenderness or muscular tenderness  Normal range of motion     Lymphadenopathy:      Head: Right side of head: No submental, submandibular, tonsillar, preauricular or posterior auricular adenopathy  Left side of head: No submental, submandibular, tonsillar, preauricular, posterior auricular or occipital adenopathy  Cervical: No cervical adenopathy  Right cervical: No superficial, deep or posterior cervical adenopathy  Left cervical: No superficial, deep or posterior cervical adenopathy  Upper Body:      Right upper body: No pectoral adenopathy  Left upper body: No pectoral adenopathy  Skin:     General: Skin is warm and dry  Coloration: Skin is not pale  Findings: No erythema or rash  Neurological:      General: No focal deficit present  Mental Status: He is alert and oriented to person, place, and time  Cranial Nerves: No cranial nerve deficit  Sensory: No sensory deficit  Motor: No tremor, abnormal muscle tone or seizure activity  Coordination: Coordination normal       Gait: Gait normal       Deep Tendon Reflexes: Reflexes are normal and symmetric  Reflexes normal    Psychiatric:         Mood and Affect: Mood normal          Behavior: Behavior normal          Thought Content: Thought content normal          Judgment: Judgment normal          Lab Review   No visits with results within 2 Month(s) from this visit  Latest known visit with results is:   Office Visit on 10/11/2022   Component Date Value Ref Range Status   • Cologuard Result 10/17/2022 Negative  Negative Final    Comment: NEGATIVE TEST RESULT  A negative Cologuard result indicates a low likelihood that a colorectal cancer (CRC) or advanced adenoma (adenomatous polyps with more advanced pre-malignant features)  is present  The chance that a person with a negative Cologuard   test has a colorectal cancer is less than 1 in 1500 (negative predictive value >99 9%) or has an  advanced adenoma is less than  5 3% (negative predictive value 94 7%)   These data are based on a prospective cross-sectional study of 10,000 individuals at   average risk for colorectal cancer who were screened with both Cologuard and colonoscopy  (Daniel POTTER  et al, N Engl J Med 2014;370(14):9277-0212) The normal value (reference range) for this assay is negative  COLOGUARD RE-SCREENING RECOMMENDATION: Periodic colorectal cancer screening is an important part of preventive healthcare for asymptomatic individuals at average risk for colorectal cancer  Following a negative Cologuard result, the [facility] Task Force screening guidelines recommend a Cologuard re-screening interval of 3 years  References: American Cancer Society Guideline for Colorectal Cancer Screening: https://www [place] com/  org/cancer/colon-rectal-cancer/iytsfsvco-yptbgvvbv-trcwqee/acs-recommendations html ; Bebeto DK, Cailin MOON, García SERNAK, Colorectal Cancer Screening:   Recommendations for Physicians and Patients from the [address] [place] [address] Task Force on Colorectal Cancer Screening , Am J Gastroenterology 2017; 353:8107-2778  TEST DESCRIPTION: Composite algorithmic analysis of stool DNA-biomarkers with hemoglobin immunoassay  Quantitative values of individual biomarkers are not reportable and are not associated with individual biomarker result reference ranges  Cologuard is   intended for colorectal cancer screening of adults of either sex, 39 years or older, who are at average-risk for colorectal cancer (CRC)  Cologuard has been approved for use by the [place]  FDA  The performance of Cologuard was established in                            a cross   sectional study of average-risk adults aged 48-80  Cologuard performance in patients ages 39 to 52 years was estimated by sub-group analysis of near-age groups   Colonoscopies performed for a positive result may find as the most clinically significant   lesion: colorectal cancer [4 0%], advanced adenoma (including sessile serrated polyps greater than or equal to 1cm diameter) [20%] or non- advanced adenoma [31%]; or no colorectal neoplasia [45%]  These estimates are derived from a prospective   cross-sectional screening study of 10,000 individuals at average risk for colorectal cancer who were screened with both Cologuard and colonoscopy  (Daniel Lopez al, [facility] J Med 2014;370(14):0592-3556 ) Cologuard may produce a false negative or false   positive result (no colorectal cancer or precancerous polyp present at colonoscopy follow up)  A negative Cologuard test result does not guarantee the absence of CRC or advanced adenoma (pre-cancer)  The current Cologuard screening in                           [facility] is every 3   years  ([address]  [facility] Task Force)  Cologuard performance data in a 10,000 patient pivotal study using colonoscopy as the reference method can be accessed at the following location: www [email or web address]/results  Additional   description of the Cologuard test process, warnings and precautions can be found at www [employer]  Patient Instructions   Pt is symptom free for this problem  This diagnosis or problem is stable/well controlled            Gina Maxwell MD        "This note has been constructed using a voice recognition system  Therefore there may be syntax, spelling, and/or grammatical errors   Please call if you have any questions  "

## 2023-01-12 ENCOUNTER — OFFICE VISIT (OUTPATIENT)
Dept: INTERNAL MEDICINE CLINIC | Facility: CLINIC | Age: 58
End: 2023-01-12

## 2023-01-12 VITALS
HEIGHT: 66 IN | OXYGEN SATURATION: 98 % | BODY MASS INDEX: 31.31 KG/M2 | DIASTOLIC BLOOD PRESSURE: 80 MMHG | WEIGHT: 194.8 LBS | RESPIRATION RATE: 16 BRPM | HEART RATE: 70 BPM | SYSTOLIC BLOOD PRESSURE: 160 MMHG | TEMPERATURE: 97.8 F

## 2023-01-12 DIAGNOSIS — F41.0 GENERALIZED ANXIETY DISORDER WITH PANIC ATTACKS: ICD-10-CM

## 2023-01-12 DIAGNOSIS — E78.2 MIXED HYPERLIPIDEMIA: ICD-10-CM

## 2023-01-12 DIAGNOSIS — F32.5 MAJOR DEPRESSIVE DISORDER WITH SINGLE EPISODE, IN FULL REMISSION (HCC): ICD-10-CM

## 2023-01-12 DIAGNOSIS — F41.1 GENERALIZED ANXIETY DISORDER WITH PANIC ATTACKS: ICD-10-CM

## 2023-01-12 DIAGNOSIS — I10 PRIMARY HYPERTENSION: ICD-10-CM

## 2023-01-12 DIAGNOSIS — R53.82 CHRONIC FATIGUE: ICD-10-CM

## 2023-01-12 DIAGNOSIS — G47.33 OBSTRUCTIVE SLEEP APNEA: Primary | ICD-10-CM

## 2023-01-12 RX ORDER — ALPRAZOLAM 0.5 MG/1
0.5 TABLET ORAL
Qty: 30 TABLET | Refills: 1 | Status: SHIPPED | OUTPATIENT
Start: 2023-01-12

## 2023-01-12 NOTE — PROGRESS NOTES
Dr Darek Roman Office Visit Note  23     Saira Marvin 59 y o  male MRN: [id number]  : 1965    Assessment:     1  Obstructive sleep apnea  Assessment & Plan:  Patient refusing to use CPAP and also refusing follow-up with the pulmonary for repeat test      2  Mixed hyperlipidemia  Assessment & Plan:  Continue live low-fat low-cholesterol diet and lipid profile after treatment      3  Major depressive disorder with single episode, in full remission ([facility] [address] )    4  Generalized anxiety disorder with panic attacks  Assessment & Plan:  Prescribe Xanax 0 5 once a day as needed for anxiety attack counseling done    Orders:  -     ALPRAZolam (XANAX) 0 5 mg tablet; Take 1 tablet (0 5 mg total) by mouth daily at bedtime as needed for anxiety    5  Primary hypertension  Assessment & Plan:  Blood pressure controlled with amlodipine and a low-salt diet continue current treatment      6  Chronic fatigue  Assessment & Plan:  Advise diet exercise multivitamin            Discussion Summary and Plan: Today's care plan and medications were reviewed with patient in detail and all their questions answered to their satisfaction  No chief complaint on file  Subjective:  Came in for follow-up of the chronic medical conditions as listed under visit diagnosis and also refill for Xanax 0 5 once a day as needed patient's overall condition unchanged stable no new symptoms some intermittent pain in the right shoulder which patient has for more than 10 years using Tylenol or ibuprofen over-the-counter for helping no other new medical problems no other new symptoms      The following portions of the patient's history were reviewed and updated as appropriate: allergies, current medications, past family history, past medical history, past social history, past surgical history and problem list     Review of Systems   Constitutional: Positive for fatigue   Negative for activity change, appetite change, chills, diaphoresis, fever and unexpected weight change  HENT: Negative for congestion, dental problem, drooling, ear discharge, ear pain, facial swelling, hearing loss, mouth sores, nosebleeds, postnasal drip, rhinorrhea, sinus pressure, sneezing, sore throat, tinnitus, trouble swallowing and voice change  Eyes: Negative for photophobia, pain, discharge, redness, itching and visual disturbance  Respiratory: Negative for apnea, cough, choking, chest tightness, shortness of breath, wheezing and stridor  Cardiovascular: Negative for chest pain, palpitations and leg swelling  Gastrointestinal: Negative for abdominal distention, abdominal pain, anal bleeding, blood in stool, constipation, diarrhea, nausea, rectal pain and vomiting  Endocrine: Negative for cold intolerance, heat intolerance, polydipsia, polyphagia and polyuria  Genitourinary: Negative for decreased urine volume, difficulty urinating, dysuria, enuresis, flank pain, frequency, genital sores, hematuria and urgency  Musculoskeletal: Positive for arthralgias, joint swelling and myalgias  Negative for back pain, gait problem, neck pain and neck stiffness  Skin: Negative for color change, pallor, rash and wound  Allergic/Immunologic: Negative  Negative for environmental allergies, food allergies and immunocompromised state  Neurological: Negative for dizziness, tremors, seizures, syncope, facial asymmetry, speech difficulty, weakness, light-headedness, numbness and headaches  Psychiatric/Behavioral: Negative for agitation, behavioral problems, confusion, decreased concentration, dysphoric mood, hallucinations, self-injury, sleep disturbance and suicidal ideas  The patient is nervous/anxious  The patient is not hyperactive            Historical Information   Patient Active Problem List   Diagnosis   • Obstructive sleep apnea   • Hypertension   • Adhesive capsulitis of right shoulder   • Biceps tendonitis on right   • Chronic fatigue   • Dyspnea on exertion   • Hyperlipidemia   • Generalized anxiety disorder with panic attacks   • Abnormal circadian rhythm   • Major depressive disorder with single episode, in full remission ([facility] [address] )     Past Medical History:   Diagnosis Date   • Anxiety    • Asthma    • Depression    • Dizziness    • High cholesterol    • Hypertension    • Lightheadedness    • Psychiatric disorder     depression anxiety     Past Surgical History:   Procedure Laterality Date   • ROTATOR CUFF REPAIR  10/24/2017   • SHOULDER SURGERY Right      Social History     Substance and Sexual Activity   Alcohol Use Yes    Comment: Occasional     Social History     Substance and Sexual Activity   Drug Use No     Social History     Tobacco Use   Smoking Status Never   Smokeless Tobacco Never     Family History   Problem Relation Age of Onset   • Stroke Mother    • Coronary artery disease Mother    • Diabetes Mother    • Hypertension Mother    • Hyperlipidemia Mother    • Diabetes Father    • Hypertension Father    • Hyperlipidemia Father    • Heart attack Daughter      Health Maintenance Due   Topic   • Hepatitis C Screening    • COVID-19 Vaccine (1)   • HIV Screening    • Annual Physical    • DTaP,Tdap,and Td Vaccines (1 - Tdap)   • Influenza Vaccine (1)      Meds/Allergies       Current Outpatient Medications:   •  ALPRAZolam (XANAX) 0 5 mg tablet, Take 1 tablet (0 5 mg total) by mouth daily, Disp: 30 tablet, Rfl: 0  •  ALPRAZolam (XANAX) 0 5 mg tablet, Take 1 tablet (0 5 mg total) by mouth daily at bedtime as needed for anxiety, Disp: 30 tablet, Rfl: 1  •  amLODIPine (NORVASC) 5 mg tablet, Take 1 tablet (5 mg total) by mouth daily at bedtime, Disp: 90 tablet, Rfl: 3  •  DULoxetine (CYMBALTA) 60 mg delayed release capsule, Take 20 mg by mouth daily, Disp: , Rfl:   •  escitalopram (LEXAPRO) 20 mg tablet, Take 1 tablet (20 mg total) by mouth daily, Disp: 90 tablet, Rfl: 3  •  nitroglycerin (NITROSTAT) 0 4 mg SL tablet, Place 1 tablet under the tongue every 5 (five) minutes as needed, Disp: , Rfl:   •  QUEtiapine (SEROquel) 25 mg tablet, Take 25 mg by mouth daily at bedtime, Disp: , Rfl:       Objective:    Vitals:   /80   Pulse 70   Temp 97 8 °F (36 6 °C)   Resp 16   Ht 5' 6" (1 676 m)   Wt 88 4 kg (194 lb 12 8 oz)   SpO2 98%   BMI 31 44 kg/m²   Body mass index is 31 44 kg/m²  Vitals:    01/12/23 1304   Weight: 88 4 kg (194 lb 12 8 oz)       Physical Exam  Constitutional:       General: He is not in acute distress  Appearance: He is well-developed  He is not ill-appearing, toxic-appearing or diaphoretic  HENT:      Head: Normocephalic and atraumatic  Right Ear: External ear normal       Left Ear: External ear normal       Nose: Nose normal       Mouth/Throat:      Pharynx: No oropharyngeal exudate  Eyes:      General: Lids are normal  Lids are everted, no foreign bodies appreciated  No scleral icterus  Right eye: No discharge  Left eye: No discharge  Conjunctiva/sclera: Conjunctivae normal       Pupils: Pupils are equal, round, and reactive to light  Neck:      Thyroid: No thyromegaly  Vascular: Normal carotid pulses  No carotid bruit, hepatojugular reflux or JVD  Trachea: No tracheal tenderness or tracheal deviation  Cardiovascular:      Rate and Rhythm: Normal rate and regular rhythm  Pulses: Normal pulses  Heart sounds: Normal heart sounds  No murmur heard  No friction rub  No gallop  Pulmonary:      Effort: Pulmonary effort is normal  No respiratory distress  Breath sounds: Normal breath sounds  No stridor  No wheezing or rales  Chest:      Chest wall: No tenderness  Abdominal:      General: Bowel sounds are normal  There is no distension  Palpations: Abdomen is soft  There is no mass  Tenderness: There is no abdominal tenderness  There is no guarding or rebound  Musculoskeletal:         General: No tenderness or deformity  Normal range of motion        Cervical back: Normal range of motion and neck supple  No edema, erythema or rigidity  No spinous process tenderness or muscular tenderness  Normal range of motion  Lymphadenopathy:      Head:      Right side of head: No submental, submandibular, tonsillar, preauricular or posterior auricular adenopathy  Left side of head: No submental, submandibular, tonsillar, preauricular, posterior auricular or occipital adenopathy  Cervical: No cervical adenopathy  Right cervical: No superficial, deep or posterior cervical adenopathy  Left cervical: No superficial, deep or posterior cervical adenopathy  Upper Body:      Right upper body: No pectoral adenopathy  Left upper body: No pectoral adenopathy  Skin:     General: Skin is warm and dry  Coloration: Skin is not pale  Findings: No erythema or rash  Neurological:      General: No focal deficit present  Mental Status: He is alert and oriented to person, place, and time  Cranial Nerves: No cranial nerve deficit  Sensory: No sensory deficit  Motor: No tremor, abnormal muscle tone or seizure activity  Coordination: Coordination normal       Gait: Gait normal       Deep Tendon Reflexes: Reflexes are normal and symmetric  Reflexes normal    Psychiatric:         Behavior: Behavior normal          Thought Content: Thought content normal          Judgment: Judgment normal          Lab Review   No visits with results within 2 Month(s) from this visit  Latest known visit with results is:   Office Visit on 10/11/2022   Component Date Value Ref Range Status   • Cologuard Result 10/17/2022 Negative  Negative Final    Comment: NEGATIVE TEST RESULT  A negative Cologuard result indicates a low likelihood that a colorectal cancer (CRC) or advanced adenoma (adenomatous polyps with more advanced pre-malignant features)  is present   The chance that a person with a negative Cologuard   test has a colorectal cancer is less than 1 in 1500 (negative predictive value >99 9%) or has an  advanced adenoma is less than  5 3% (negative predictive value 94 7%)  These data are based on a prospective cross-sectional study of 10,000 individuals at   average risk for colorectal cancer who were screened with both Cologuard and colonoscopy  (Daniel POTTER  et al, N Engl J Med 2014;370(14):4206-3262) The normal value (reference range) for this assay is negative  COLOGUARD RE-SCREENING RECOMMENDATION: Periodic colorectal cancer screening is an important part of preventive healthcare for asymptomatic individuals at average risk for colorectal cancer  Following a negative Cologuard result, the [facility] Task Force screening guidelines recommend a Cologuard re-screening interval of 3 years  References: American Cancer Society Guideline for Colorectal Cancer Screening: https://www [place] com/  org/cancer/colon-rectal-cancer/vppbfmzia-eycjouasn-oiirsgz/acs-recommendations html ; Bebeto NOLAN, Cailin MOON, García SERNAK, Colorectal Cancer Screening:   Recommendations for Physicians and Patients from the [address] [place] [address] Task Force on Colorectal Cancer Screening , Am J Gastroenterology 2017; 066:2521-5100  TEST DESCRIPTION: Composite algorithmic analysis of stool DNA-biomarkers with hemoglobin immunoassay  Quantitative values of individual biomarkers are not reportable and are not associated with individual biomarker result reference ranges  Cologuard is   intended for colorectal cancer screening of adults of either sex, 39 years or older, who are at average-risk for colorectal cancer (CRC)  Cologuard has been approved for use by the [place]  FDA  The performance of Cologuard was established in                            a cross   sectional study of average-risk adults aged 48-80  Cologuard performance in patients ages 39 to 52 years was estimated by sub-group analysis of near-age groups   Colonoscopies performed for a positive result may find as the most clinically significant   lesion: colorectal cancer [4 0%], advanced adenoma (including sessile serrated polyps greater than or equal to 1cm diameter) [20%] or non- advanced adenoma [31%]; or no colorectal neoplasia [45%]  These estimates are derived from a prospective   cross-sectional screening study of 10,000 individuals at average risk for colorectal cancer who were screened with both Cologuard and colonoscopy  (Daniel POTTER  et al, Tobias Foleyak J Med 2014;370(14):7769-7379 ) Cologuard may produce a false negative or false   positive result (no colorectal cancer or precancerous polyp present at colonoscopy follow up)  A negative Cologuard test result does not guarantee the absence of CRC or advanced adenoma (pre-cancer)  The current Cologuard screening in                           [facility] is every 3   years  ([address] [place] [address] Task Force)  Cologuard performance data in a 10,000 patient pivotal study using colonoscopy as the reference method can be accessed at the following location: www [employer]/results  Additional   description of the Cologuard test process, warnings and precautions can be found at www [employer]  com  Patient Instructions   Pt is symptom free for this problem  This diagnosis or problem is stable/well controlled  Patient is advised to continue same meds as outlined in medicine list         Federico Finn MD        "This note has been constructed using a voice recognition system  Therefore there may be syntax, spelling, and/or grammatical errors   Please call if you have any questions  " room air

## 2023-03-06 ENCOUNTER — RA CDI HCC (OUTPATIENT)
Dept: OTHER | Facility: HOSPITAL | Age: 58
End: 2023-03-06

## 2023-03-10 ENCOUNTER — OFFICE VISIT (OUTPATIENT)
Dept: INTERNAL MEDICINE CLINIC | Facility: CLINIC | Age: 58
End: 2023-03-10

## 2023-03-10 VITALS
TEMPERATURE: 98 F | HEART RATE: 77 BPM | OXYGEN SATURATION: 99 % | RESPIRATION RATE: 15 BRPM | DIASTOLIC BLOOD PRESSURE: 90 MMHG | SYSTOLIC BLOOD PRESSURE: 140 MMHG | HEIGHT: 66 IN | BODY MASS INDEX: 31.72 KG/M2 | WEIGHT: 197.4 LBS

## 2023-03-10 DIAGNOSIS — F32.5 MAJOR DEPRESSIVE DISORDER WITH SINGLE EPISODE, IN FULL REMISSION (HCC): ICD-10-CM

## 2023-03-10 DIAGNOSIS — F41.1 GENERALIZED ANXIETY DISORDER WITH PANIC ATTACKS: ICD-10-CM

## 2023-03-10 DIAGNOSIS — I10 PRIMARY HYPERTENSION: Primary | ICD-10-CM

## 2023-03-10 DIAGNOSIS — F41.0 GENERALIZED ANXIETY DISORDER WITH PANIC ATTACKS: ICD-10-CM

## 2023-03-10 DIAGNOSIS — E78.2 MIXED HYPERLIPIDEMIA: ICD-10-CM

## 2023-03-10 DIAGNOSIS — M75.01 ADHESIVE CAPSULITIS OF RIGHT SHOULDER: ICD-10-CM

## 2023-03-10 DIAGNOSIS — R06.09 DYSPNEA ON EXERTION: ICD-10-CM

## 2023-03-10 RX ORDER — ALPRAZOLAM 0.5 MG/1
0.5 TABLET ORAL DAILY
Qty: 30 TABLET | Refills: 0 | Status: SHIPPED | OUTPATIENT
Start: 2023-03-10

## 2023-03-10 NOTE — ASSESSMENT & PLAN NOTE
Still having intermittent pain finish the physical therapy recommended follow-up with the orthopedist

## 2023-03-10 NOTE — PROGRESS NOTES
Dr Fatou Natarajan Office Visit Note  03/10/23     Gerardo Cantu 59 y o  male MRN: [id number]  : 1965    Assessment:     1  Generalized anxiety disorder with panic attacks  -     ALPRAZolam (XANAX) 0 5 mg tablet; Take 1 tablet (0 5 mg total) by mouth daily          Discussion Summary and Plan: Today's care plan and medications were reviewed with patient in detail and all their questions answered to their satisfaction  Chief Complaint   Patient presents with   • Follow-up     refills      Subjective:  Came in for follow-up chronic medical condition which is stable and also refill for the Xanax that patient takes 0 5 mg once a day for anxiety and panic attack seems that once a day 0 5 is helping patient sleep at nighttime control symptoms of anxiety in the daytime able to work full-time and carry out day-to-day activities no other symptoms no other new medical problems  Change since visit as above for refill for Xanax no new medical problems no new symptoms      The following portions of the patient's history were reviewed and updated as appropriate: allergies, current medications, past family history, past medical history, past social history, past surgical history and problem list     Review of Systems   Constitutional: Positive for fatigue  Negative for activity change, appetite change, chills, diaphoresis, fever and unexpected weight change  HENT: Negative for congestion, dental problem, drooling, ear discharge, ear pain, facial swelling, hearing loss, mouth sores, nosebleeds, postnasal drip, rhinorrhea, sinus pressure, sneezing, sore throat, tinnitus, trouble swallowing and voice change  Eyes: Negative for photophobia, pain, discharge, redness, itching and visual disturbance  Respiratory: Negative for apnea, cough, choking, chest tightness, shortness of breath, wheezing and stridor  Cardiovascular: Negative for chest pain, palpitations and leg swelling     Gastrointestinal: Negative for abdominal distention, abdominal pain, anal bleeding, blood in stool, constipation, diarrhea, nausea, rectal pain and vomiting  Endocrine: Negative for cold intolerance, heat intolerance, polydipsia, polyphagia and polyuria  Genitourinary: Negative for decreased urine volume, difficulty urinating, dysuria, enuresis, flank pain, frequency, genital sores, hematuria and urgency  Musculoskeletal: Positive for arthralgias  Negative for back pain, gait problem, neck pain and neck stiffness  Skin: Negative for color change, pallor, rash and wound  Allergic/Immunologic: Negative  Negative for environmental allergies, food allergies and immunocompromised state  Neurological: Negative for dizziness, tremors, seizures, syncope, facial asymmetry, speech difficulty, weakness, light-headedness, numbness and headaches  Psychiatric/Behavioral: Negative for agitation, behavioral problems, confusion, decreased concentration, dysphoric mood, hallucinations, self-injury, sleep disturbance and suicidal ideas  The patient is nervous/anxious  The patient is not hyperactive            Historical Information   Patient Active Problem List   Diagnosis   • Obstructive sleep apnea   • Hypertension   • Adhesive capsulitis of right shoulder   • Biceps tendonitis on right   • Chronic fatigue   • Dyspnea on exertion   • Hyperlipidemia   • Generalized anxiety disorder with panic attacks   • Abnormal circadian rhythm   • Major depressive disorder with single episode, in full remission [facility])     Past Medical History:   Diagnosis Date   • Anxiety    • Asthma    • Depression    • Dizziness    • High cholesterol    • Hypertension    • Lightheadedness    • Psychiatric disorder     depression anxiety     Past Surgical History:   Procedure Laterality Date   • ROTATOR CUFF REPAIR  10/24/2017   • SHOULDER SURGERY Right      Social History     Substance and Sexual Activity   Alcohol Use Yes    Comment: Occasional     Social History     Substance and Sexual Activity   Drug Use No     Social History     Tobacco Use   Smoking Status Never   Smokeless Tobacco Never     Family History   Problem Relation Age of Onset   • Stroke Mother    • Coronary artery disease Mother    • Diabetes Mother    • Hypertension Mother    • Hyperlipidemia Mother    • Diabetes Father    • Hypertension Father    • Hyperlipidemia Father    • Heart attack Daughter      Health Maintenance Due   Topic   • Hepatitis C Screening    • COVID-19 Vaccine (1)   • HIV Screening    • Annual Physical    • DTaP,Tdap,and Td Vaccines (1 - Tdap)   • Influenza Vaccine (1)      Meds/Allergies       Current Outpatient Medications:   •  ALPRAZolam (XANAX) 0 5 mg tablet, Take 1 tablet (0 5 mg total) by mouth daily at bedtime as needed for anxiety, Disp: 30 tablet, Rfl: 1  •  ALPRAZolam (XANAX) 0 5 mg tablet, Take 1 tablet (0 5 mg total) by mouth daily, Disp: 30 tablet, Rfl: 0  •  amLODIPine (NORVASC) 5 mg tablet, Take 1 tablet (5 mg total) by mouth daily at bedtime, Disp: 90 tablet, Rfl: 3  •  DULoxetine (CYMBALTA) 60 mg delayed release capsule, Take 20 mg by mouth daily, Disp: , Rfl:   •  escitalopram (LEXAPRO) 20 mg tablet, Take 1 tablet (20 mg total) by mouth daily, Disp: 90 tablet, Rfl: 3  •  nitroglycerin (NITROSTAT) 0 4 mg SL tablet, Place 1 tablet under the tongue every 5 (five) minutes as needed, Disp: , Rfl:   •  QUEtiapine (SEROquel) 25 mg tablet, Take 25 mg by mouth daily at bedtime, Disp: , Rfl:       Objective:    Vitals:   /90   Pulse 77   Temp 98 °F (36 7 °C)   Resp 15   Ht 5' 6" (1 676 m)   Wt 89 5 kg (197 lb 6 4 oz)   SpO2 99%   BMI 31 86 kg/m²   Body mass index is 31 86 kg/m²  Vitals:    03/10/23 0945   Weight: 89 5 kg (197 lb 6 4 oz)       Physical Exam  Constitutional:       General: He is not in acute distress  Appearance: He is well-developed  He is not ill-appearing, toxic-appearing or diaphoretic  HENT:      Head: Normocephalic and atraumatic        Right Ear: External ear normal       Left Ear: External ear normal       Nose: Nose normal       Mouth/Throat:      Pharynx: No oropharyngeal exudate  Eyes:      General: Lids are normal  Lids are everted, no foreign bodies appreciated  No scleral icterus  Right eye: No discharge  Left eye: No discharge  Conjunctiva/sclera: Conjunctivae normal       Pupils: Pupils are equal, round, and reactive to light  Neck:      Thyroid: No thyromegaly  Vascular: Normal carotid pulses  No carotid bruit, hepatojugular reflux or JVD  Trachea: No tracheal tenderness or tracheal deviation  Cardiovascular:      Rate and Rhythm: Normal rate and regular rhythm  Pulses: Normal pulses  Heart sounds: Normal heart sounds  No murmur heard  No friction rub  No gallop  Pulmonary:      Effort: Pulmonary effort is normal  No respiratory distress  Breath sounds: Normal breath sounds  No stridor  No wheezing or rales  Chest:      Chest wall: No tenderness  Abdominal:      General: Bowel sounds are normal  There is no distension  Palpations: Abdomen is soft  There is no mass  Tenderness: There is no abdominal tenderness  There is no guarding or rebound  Musculoskeletal:         General: No tenderness or deformity  Normal range of motion  Cervical back: Normal range of motion and neck supple  No edema, erythema or rigidity  No spinous process tenderness or muscular tenderness  Normal range of motion  Lymphadenopathy:      Head:      Right side of head: No submental, submandibular, tonsillar, preauricular or posterior auricular adenopathy  Left side of head: No submental, submandibular, tonsillar, preauricular, posterior auricular or occipital adenopathy  Cervical: No cervical adenopathy  Right cervical: No superficial, deep or posterior cervical adenopathy  Left cervical: No superficial, deep or posterior cervical adenopathy        Upper Body:      Right upper body: No pectoral adenopathy  Left upper body: No pectoral adenopathy  Skin:     General: Skin is warm and dry  Coloration: Skin is not pale  Findings: No erythema or rash  Neurological:      General: No focal deficit present  Mental Status: He is alert and oriented to person, place, and time  Cranial Nerves: No cranial nerve deficit  Sensory: No sensory deficit  Motor: No tremor, abnormal muscle tone or seizure activity  Coordination: Coordination normal       Gait: Gait normal       Deep Tendon Reflexes: Reflexes are normal and symmetric  Reflexes normal    Psychiatric:         Behavior: Behavior normal          Thought Content: Thought content normal          Judgment: Judgment normal          Lab Review   No visits with results within 2 Month(s) from this visit  Latest known visit with results is:   Office Visit on 10/11/2022   Component Date Value Ref Range Status   • Cologuard Result 10/17/2022 Negative  Negative Final    Comment: NEGATIVE TEST RESULT  A negative Cologuard result indicates a low likelihood that a colorectal cancer (CRC) or advanced adenoma (adenomatous polyps with more advanced pre-malignant features)  is present  The chance that a person with a negative Cologuard   test has a colorectal cancer is less than 1 in 1500 (negative predictive value >99 9%) or has an  advanced adenoma is less than  5 3% (negative predictive value 94 7%)  These data are based on a prospective cross-sectional study of 10,000 individuals at   average risk for colorectal cancer who were screened with both Cologuard and colonoscopy  (Daniel POTTER  et al, N Engl J Med 2014;370(14):8288-5204) The normal value (reference range) for this assay is negative  COLOGUARD RE-SCREENING RECOMMENDATION: Periodic colorectal cancer screening is an important part of preventive healthcare for asymptomatic individuals at average risk for colorectal cancer    Following a negative Cologuard result, the American Cancer   Society and [place] [employer] Task Force screening guidelines recommend a Cologuard re-screening interval of 3 years  References: American Cancer Society Guideline for Colorectal Cancer Screening: https://www [place] com/  org/cancer/colon-rectal-cancer/fqpiejaye-rcqzlswrs-lyjlqso/acs-recommendations html ; Bebeto NOLAN, Cailin MOON, García SERNAK, Colorectal Cancer Screening:   Recommendations for Physicians and Patients from the [address] [place] [address] Task Force on Colorectal Cancer Screening , Am J Gastroenterology 2017; 021:6393-0085  TEST DESCRIPTION: Composite algorithmic analysis of stool DNA-biomarkers with hemoglobin immunoassay  Quantitative values of individual biomarkers are not reportable and are not associated with individual biomarker result reference ranges  Cologuard is   intended for colorectal cancer screening of adults of either sex, 39 years or older, who are at average-risk for colorectal cancer (CRC)  Cologuard has been approved for use by the [place]  FDA  The performance of Cologuard was established in                            a cross   sectional study of average-risk adults aged 48-80  Cologuard performance in patients ages 39 to 52 years was estimated by sub-group analysis of near-age groups  Colonoscopies performed for a positive result may find as the most clinically significant   lesion: colorectal cancer [4 0%], advanced adenoma (including sessile serrated polyps greater than or equal to 1cm diameter) [20%] or non- advanced adenoma [31%]; or no colorectal neoplasia [45%]  These estimates are derived from a prospective   cross-sectional screening study of 10,000 individuals at average risk for colorectal cancer who were screened with both Cologuard and colonoscopy  (Daniel POTTER  et al, [facility] Kelp J Med 2014;370(14):2145-3251 ) Cologuard may produce a false negative or false   positive result (no colorectal cancer or precancerous polyp present at colonoscopy follow up)   A negative Cologuard test result does not guarantee the absence of CRC or advanced adenoma (pre-cancer)  The current Cologuard screening in                           [facility] is every 3   years  ([address]  [facility] Task Force)  Cologuard performance data in a 10,000 patient pivotal study using colonoscopy as the reference method can be accessed at the following location: www [employer]/results  Additional   description of the Cologuard test process, warnings and precautions can be found at www [employer]  Patient Instructions   Pt is symptom free for this problem  This diagnosis or problem is stable/well controlled  Patient is advised to continue same meds as outlined in medicine list            Tyler Hanson MD        "This note has been constructed using a voice recognition system  Therefore there may be syntax, spelling, and/or grammatical errors   Please call if you have any questions  "

## 2023-03-10 NOTE — PROGRESS NOTES
BMI Counseling: Body mass index is 31 86 kg/m²  The BMI is above normal  Nutrition recommendations include decreasing portion sizes, encouraging healthy choices of fruits and vegetables, decreasing fast food intake, consuming healthier snacks, limiting drinks that contain sugar, moderation in carbohydrate intake, increasing intake of lean protein, reducing intake of saturated and trans fat and reducing intake of cholesterol  Exercise recommendations include moderate physical activity 150 minutes/week and exercising 3-5 times per week  No pharmacotherapy was ordered  Rationale for BMI follow-up plan is due to patient being overweight or obese

## 2025-01-28 ENCOUNTER — TELEPHONE (OUTPATIENT)
Dept: INTERNAL MEDICINE CLINIC | Facility: CLINIC | Age: 60
End: 2025-01-28